# Patient Record
Sex: FEMALE | Race: WHITE | NOT HISPANIC OR LATINO | Employment: FULL TIME | URBAN - METROPOLITAN AREA
[De-identification: names, ages, dates, MRNs, and addresses within clinical notes are randomized per-mention and may not be internally consistent; named-entity substitution may affect disease eponyms.]

---

## 2019-10-21 ENCOUNTER — TRANSFERRED RECORDS (OUTPATIENT)
Dept: HEALTH INFORMATION MANAGEMENT | Facility: CLINIC | Age: 49
End: 2019-10-21

## 2021-01-01 ENCOUNTER — TRANSFERRED RECORDS (OUTPATIENT)
Dept: MULTI SPECIALTY CLINIC | Facility: CLINIC | Age: 51
End: 2021-01-01

## 2021-01-01 LAB — PAP SMEAR - HIM PATIENT REPORTED: NEGATIVE

## 2022-04-08 ENCOUNTER — TRANSFERRED RECORDS (OUTPATIENT)
Dept: MULTI SPECIALTY CLINIC | Facility: CLINIC | Age: 52
End: 2022-04-08

## 2023-01-07 ENCOUNTER — HEALTH MAINTENANCE LETTER (OUTPATIENT)
Age: 53
End: 2023-01-07

## 2023-01-12 ENCOUNTER — OFFICE VISIT (OUTPATIENT)
Dept: FAMILY MEDICINE | Facility: CLINIC | Age: 53
End: 2023-01-12
Payer: COMMERCIAL

## 2023-01-12 ENCOUNTER — TELEPHONE (OUTPATIENT)
Dept: FAMILY MEDICINE | Facility: CLINIC | Age: 53
End: 2023-01-12

## 2023-01-12 VITALS
HEART RATE: 83 BPM | RESPIRATION RATE: 16 BRPM | DIASTOLIC BLOOD PRESSURE: 73 MMHG | SYSTOLIC BLOOD PRESSURE: 103 MMHG | WEIGHT: 179 LBS | BODY MASS INDEX: 37.57 KG/M2 | TEMPERATURE: 98 F | HEIGHT: 58 IN | OXYGEN SATURATION: 97 %

## 2023-01-12 DIAGNOSIS — Z13.1 SCREENING FOR DIABETES MELLITUS: ICD-10-CM

## 2023-01-12 DIAGNOSIS — Z13.220 SCREENING FOR HYPERLIPIDEMIA: ICD-10-CM

## 2023-01-12 DIAGNOSIS — E66.812 OBESITY, CLASS II, BMI 35-39.9: ICD-10-CM

## 2023-01-12 DIAGNOSIS — Z11.59 NEED FOR HEPATITIS C SCREENING TEST: ICD-10-CM

## 2023-01-12 DIAGNOSIS — Z00.00 ROUTINE GENERAL MEDICAL EXAMINATION AT A HEALTH CARE FACILITY: Primary | ICD-10-CM

## 2023-01-12 DIAGNOSIS — Z86.32 HISTORY OF GESTATIONAL DIABETES: ICD-10-CM

## 2023-01-12 DIAGNOSIS — Z13.6 CARDIOVASCULAR SCREENING; LDL GOAL LESS THAN 160: ICD-10-CM

## 2023-01-12 LAB
CHOLEST SERPL-MCNC: 275 MG/DL
FASTING STATUS PATIENT QL REPORTED: YES
GLUCOSE SERPL-MCNC: 107 MG/DL (ref 70–99)
HCV AB SERPL QL IA: NONREACTIVE
HDLC SERPL-MCNC: 55 MG/DL
LDLC SERPL CALC-MCNC: 193 MG/DL
NONHDLC SERPL-MCNC: 220 MG/DL
TRIGL SERPL-MCNC: 136 MG/DL

## 2023-01-12 PROCEDURE — 99213 OFFICE O/P EST LOW 20 MIN: CPT | Mod: 25 | Performed by: FAMILY MEDICINE

## 2023-01-12 PROCEDURE — 36415 COLL VENOUS BLD VENIPUNCTURE: CPT | Performed by: FAMILY MEDICINE

## 2023-01-12 PROCEDURE — 99386 PREV VISIT NEW AGE 40-64: CPT | Performed by: FAMILY MEDICINE

## 2023-01-12 PROCEDURE — 82947 ASSAY GLUCOSE BLOOD QUANT: CPT | Performed by: FAMILY MEDICINE

## 2023-01-12 PROCEDURE — 86803 HEPATITIS C AB TEST: CPT | Performed by: FAMILY MEDICINE

## 2023-01-12 PROCEDURE — 80061 LIPID PANEL: CPT | Performed by: FAMILY MEDICINE

## 2023-01-12 ASSESSMENT — ENCOUNTER SYMPTOMS
CHILLS: 0
NAUSEA: 0
SHORTNESS OF BREATH: 0
HEARTBURN: 0
EYE PAIN: 0
DYSURIA: 0
COUGH: 0
HEMATURIA: 0
ARTHRALGIAS: 1
BREAST MASS: 0
FEVER: 0
PARESTHESIAS: 0
PALPITATIONS: 0
HEMATOCHEZIA: 0
HEADACHES: 0
CONSTIPATION: 0
SORE THROAT: 0
NERVOUS/ANXIOUS: 0
JOINT SWELLING: 0
DIZZINESS: 0
ABDOMINAL PAIN: 0
FREQUENCY: 0
MYALGIAS: 0
WEAKNESS: 0
DIARRHEA: 0

## 2023-01-12 ASSESSMENT — PAIN SCALES - GENERAL: PAINLEVEL: NO PAIN (0)

## 2023-01-12 NOTE — PROGRESS NOTES
SUBJECTIVE:   CC: Ameena is an 52 year old who presents for preventive health visit.     She works for [employer], part at home, part at office. Two adult children, son is , lives in town, has a child, daughter lives in [place].    Patient has been advised of split billing requirements and indicates understanding: Yes  Healthy Habits:     Getting at least 3 servings of Calcium per day:  Yes    Bi-annual eye exam:  NO    Dental care twice a year:  Yes    Sleep apnea or symptoms of sleep apnea:  None    Diet:  Regular (no restrictions)    Frequency of exercise:  2-3 days/week    Duration of exercise:  30-45 minutes    Taking medications regularly:  Yes    Medication side effects:  Not applicable    PHQ-2 Total Score: 0    Additional concerns today:  Yes    Today's PHQ-2 Score:   PHQ-2 ( 1999 Pfizer) 1/12/2023   Q1: Little interest or pleasure in doing things 0   Q2: Feeling down, depressed or hopeless 0   PHQ-2 Score 0   Q1: Little interest or pleasure in doing things Not at all   Q2: Feeling down, depressed or hopeless Not at all   PHQ-2 Score 0       Have you ever done Advance Care Planning? (For example, a Health Directive, POLST, or a discussion with a medical provider or your loved ones about your wishes): Yes, patient states has an Advance Care Planning document and will bring a copy to the clinic.    Social History     Tobacco Use     Smoking status: Never     Smokeless tobacco: Never   Substance Use Topics     Alcohol use: Not Currently     Alcohol/week: 1.0 standard drink     Types: 1 Standard drinks or equivalent per week         Alcohol Use 1/12/2023   Prescreen: >3 drinks/day or >7 drinks/week? No       Reviewed orders with patient.  Reviewed health maintenance and updated orders accordingly - Yes  BP Readings from Last 3 Encounters:   01/12/23 103/73    Wt Readings from Last 3 Encounters:   01/12/23 81.2 kg (179 lb)                  Patient Active Problem List   Diagnosis     History of  gestational diabetes     Obesity, Class II, BMI 35-39.9     Past Surgical History:   Procedure Laterality Date     APPENDECTOMY  1991    laparoscopic     BUNIONECTOMY LOU BILATERAL Bilateral 2012    and       SECTION  1995     HYSTERECTOMY  2001    menometrorhagia, no malignancy, ovaries, cervix remain       Social History     Tobacco Use     Smoking status: Never     Smokeless tobacco: Never   Substance Use Topics     Alcohol use: Not Currently     Alcohol/week: 1.0 standard drink     Types: 1 Standard drinks or equivalent per week     Family History   Problem Relation Age of Onset     Depression Mother      Prostate Cancer Father      Cerebrovascular Disease Father 85        d of massive stroke     Dementia Father      Colon Polyps Sister      Colon Polyps Brother          Current Outpatient Medications   Medication Sig Dispense Refill     semaglutide (OZEMPIC) 2 MG/1.5ML SOPN pen Inject 0.25 mg Subcutaneous every 7 days 1.5 mL 1     Allergies   Allergen Reactions     Penicillins Rash     No lab results found.     Breast Cancer Screenin2022 2:36 PM CDT  [facility]  PROCEDURE: MAMM SABAS SCREEN DIGITAL BILAT.    HISTORY: Breast cancer screening.    TECHNIQUE: Full-field digital mammography of the bilateral breasts was performed  in conventional CC and MLO views utilizing tomosynthesis.    COMPARISON: Mammograms 2021, 2020, and 2015.    FINDINGS:     There are scattered areas of fibroglandular density.    Right breast: No suspicious abnormality.    Left breast: No suspicious abnormality.    IMPRESSION:   No mammographic evidence of breast malignancy.  The patient will be notified of the results.    Recommendation: Normal Interval Follow-up    BI-RADS 1: Negative.    Electronically signed by Domingo Wren MD   Report Date: 2022 2:29 PM           Breast CA Risk Assessment (FHS-7) 2023   Do you have a family  history of breast, colon, or ovarian cancer? No / Unknown         Mammogram Screening: Recommended annual mammography  Pertinent mammograms are reviewed under the imaging tab.    History of abnormal Pap smear: NO - age 30-65 PAP every 5 years with negative HPV co-testing recommended     Reviewed and updated as needed this visit by clinical staff   Tobacco  Allergies  Meds  Problems  Med Hx   Fam Hx          Reviewed and updated as needed this visit by Provider   Tobacco  Allergies   Problems  Med Hx   Fam Hx         Past Medical History:   Diagnosis Date     History of colonic polyps       Past Surgical History:   Procedure Laterality Date     APPENDECTOMY  1991    laparoscopic     BUNIONECTOMY LOU BILATERAL Bilateral 2012    and       SECTION  1995     HYSTERECTOMY  2001    menometrorhagia, no malignancy, ovaries, cervix remain     OB History    Para Term  AB Living   2 2 0 2 0 2   SAB IAB Ectopic Multiple Live Births   0 0 0 0 2      # Outcome Date GA Lbr Jamarcus/2nd Weight Sex Delivery Anes PTL Lv   2  99 36w0d  3.118 kg (6 lb 14 oz) F CS-LTranv   CHINYERE      Birth Comments: bed rest for 5 months,  labor, csection when lungs werwe mature      Name: Carmella Anderson  95 36w0d  2.863 kg (6 lb 5 oz) M CS-LTranv  Y CHINYERE      Birth Comments: partial placental abruption at 28 weeks, bedrest, failure to progress with labor      Name: Gerardo       Review of Systems   Constitutional: Negative for chills and fever.   HENT: Negative for congestion, ear pain, hearing loss and sore throat.    Eyes: Negative for pain and visual disturbance.   Respiratory: Negative for cough and shortness of breath.    Cardiovascular: Negative for chest pain, palpitations and peripheral edema.   Gastrointestinal: Negative for abdominal pain, constipation, diarrhea, heartburn, hematochezia and nausea.   Breasts:  Negative for tenderness, breast mass  "and discharge.   Genitourinary: Negative for dysuria, frequency, genital sores, hematuria, pelvic pain, urgency, vaginal bleeding and vaginal discharge.   Musculoskeletal: Positive for arthralgias. Negative for joint swelling and myalgias.   Skin: Negative for rash.   Neurological: Negative for dizziness, weakness, headaches and paresthesias.   Psychiatric/Behavioral: Negative for mood changes. The patient is not nervous/anxious.      Family History   Problem Relation Age of Onset     Depression Mother      Prostate Cancer Father      Cerebrovascular Disease Father 85        d of massive stroke     Dementia Father      Colon Polyps Sister      Colon Polyps Brother          OBJECTIVE:   /73 (BP Location: Left arm, Patient Position: Sitting, Cuff Size: Adult Large)   Pulse 83   Temp 98  F (36.7  C) (Tympanic)   Resp 16   Ht 1.461 m (4' 9.5\")   Wt 81.2 kg (179 lb)   SpO2 97%   BMI 38.06 kg/m     Wt Readings from Last 4 Encounters:   01/12/23 81.2 kg (179 lb)     Physical Exam  GENERAL: healthy, alert and no distress  EYES: Eyes grossly normal to inspection, PERRL and conjunctivae and sclerae normal  HENT: ear canals and TM's normal, nose and mouth without ulcers or lesions  NECK: no adenopathy, no asymmetry, masses, or scars and thyroid normal to palpation  RESP: lungs clear to auscultation - no rales, rhonchi or wheezes  CV: regular rate and rhythm, normal S1 S2, no S3 or S4, no murmur, click or rub, no peripheral edema and peripheral pulses strong  MS: no gross musculoskeletal defects noted, no edema  SKIN: no suspicious lesions or rashes  NEURO: Normal strength and tone, mentation intact and speech normal  PSYCH: mentation appears normal, affect normal/bright    ASSESSMENT/PLAN:   (Z00.00) Routine general medical examination at a health care facility  (primary encounter diagnosis)  Comment: routine, healthy woman, utd with outside records as documented    (Z11.59) Need for hepatitis C screening " test  Plan: Hepatitis C Screen Reflex to HCV RNA Quant and         Genotype    (Z13.6) CARDIOVASCULAR SCREENING; LDL GOAL LESS THAN 160  Plan: Lipid panel reflex to direct LDL Non-fasting  Biometric screen completed.    (Z13.220) Screening for hyperlipidemia  Plan: Lipid panel reflex to direct LDL Non-fasting    (Z86.32) History of gestational diabetes  Plan: Glucose      Will fu as indicated.     (Z13.1) Screening for diabetes mellitus  Plan: Glucose    (E66.9) Obesity, Class II, BMI 35-39.9  Comment: already participating in nutritional wellness program, using Noom, incorporating many healthy habits, see AVS.  Start medication as below, follow up 1-2 months  Plan: semaglutide (OZEMPIC) 2 MG/1.5ML SOPN pen         Patient has been advised of split billing requirements and indicates understanding: Yes      COUNSELING:  Reviewed preventive health counseling, as reflected in patient instructions       Regular exercise       Healthy diet/nutrition       Colorectal Cancer Screening        She reports that she has never smoked. She has never used smokeless tobacco.          Jacinta Vera MD  [facility]

## 2023-01-12 NOTE — TELEPHONE ENCOUNTER
"PA started for semaglutide (OZEMPIC) 2 MG/1.5ML SOPN pen.  To submit the PA:  1. Go to [email or web address] and click Enter a key  2. Enter the pt's last name and date of birth and the ricks   Patient last name:Tsering   :[birth year]   Ricks:[id number]  3. Complete the form and click \"Send to Plan\"    "

## 2023-01-12 NOTE — PATIENT INSTRUCTIONS
Weight loss  Great job starting every day with breakfast; add something with fiber;  Add oatmeal? Flax or janki seeds?  Activity; great idea weight lifting, increases your metabolism! Add 5 minute high intensity every hour at work (up and down stairs; plank for a minute)  Great job with water    Preventive Health Recommendations  Female Ages 50 - 64    Yearly exam: See your health care provider every year in order to  Review health changes.   Discuss preventive care.    Review your medicines if your doctor has prescribed any.    Get a Pap test every three years (unless you have an abnormal result and your provider advises testing more often).  If you get Pap tests with HPV test, you only need to test every 5 years, unless you have an abnormal result.   You do not need a Pap test if your uterus was removed (hysterectomy) and you have not had cancer.  You should be tested each year for STDs (sexually transmitted diseases) if you're at risk.   Have a mammogram every 1 to 2 years.  Have a colonoscopy at age 50, or have a yearly FIT test (stool test). These exams screen for colon cancer.    Have a cholesterol test every 5 years, or more often if advised.  Have a diabetes test (fasting glucose) every three years. If you are at risk for diabetes, you should have this test more often.   If you are at risk for osteoporosis (brittle bone disease), think about having a bone density scan (DEXA).    Shots: Get a flu shot each year. Get a tetanus shot every 10 years.    Nutrition:   Eat at least 5 servings of fruits and vegetables each day.  Eat whole-grain bread, whole-wheat pasta and brown rice instead of white grains and rice.  Get adequate Calcium and Vitamin D.     Lifestyle  Exercise at least 150 minutes a week (30 minutes a day, 5 days a week). This will help you control your weight and prevent disease.  Limit alcohol to one drink per day.  No smoking.   Wear sunscreen to prevent skin cancer.   See your dentist every six  months for an exam and cleaning.  See your eye doctor every 1 to 2 years.

## 2023-01-12 NOTE — Clinical Note
Normal mammogram: 04/08/2022 2:36 PM CDT  [facility] PROCEDURE: MAMM SABAS SCREEN DIGITAL BILAT.  HISTORY: Breast cancer screening.

## 2023-01-17 NOTE — TELEPHONE ENCOUNTER
PA Initiation    Medication: semaglutide (OZEMPIC) 2 MG/1.5ML SOPN pen - Initiated  Insurance Company: [employer] ([facility]) - Phone [phone] Fax [phone]  Pharmacy Filling the Rx: [id number] - [place], [place] - [address] AT [facility] [address]  Filling Pharmacy Phone: [phone]  Filling Pharmacy Fax: [phone]  Start Date: 1/17/2023

## 2023-01-17 NOTE — TELEPHONE ENCOUNTER
PRIOR AUTHORIZATION DENIED    Medication: semaglutide (OZEMPIC) 2 MG/1.5ML SOPN pen - DENIED    Denial Date: 1/17/2023    Denial Rational:      Appeal Information:

## 2023-01-17 NOTE — RESULT ENCOUNTER NOTE
Thank you very much for getting labs done!     Your screening test was negative for Hepatitis C, which is great news! You have NOT been infected with this liver disease.  You do not need any further testing for Hepatitis C.     Your glucose test was normal for a nonfasting sample.    Your total cholesterol and LDL cholesterol are a little high.  Thank you for getting your cholesterol checked!  Desired or goal levels are:  CHOLESTEROL: Desirable is less than 200.  HDL (Good Cholesterol): Desirable is greater than 40 in men and greater than 50 in women.  LDL (Bad Cholesterol): Desirable is less than 130  TRIGLYCERIDES: Desirable is less than 150.    As you may know, abnormal cholesterol is one factor that increases your risk for heart disease and stroke. You can improve your cholesterol by controlling the amount and type of fat you eat and by increasing your daily activity level.    Here are some ways to improve your nutrition (adapted from the American Academy of Family Practice handout):  Eat less fat (especially butter, Crisco and other saturated fats)  Buy lean cuts of meat; reduce your portions of red meat or substitute poultry or fish, or avoid meat altogether.  Use skim milk and low-fat dairy products  Eat no more than 4 egg yolks per week  Avoid fried or fast foods that are high in fat  Eat more fruits and vegetables, trying to make your plate of food at least half non-starchy vegetables.    If you have any questions, please contact the clinic or schedule an appointment with me, thank you!      Sincerely,  Dr. Jacinta Vera MD  1/17/2023

## 2023-01-26 ENCOUNTER — TELEPHONE (OUTPATIENT)
Dept: FAMILY MEDICINE | Facility: CLINIC | Age: 53
End: 2023-01-26
Payer: COMMERCIAL

## 2023-01-26 NOTE — TELEPHONE ENCOUNTER
Forms/Letter Request    Type of form/letter: Medical    Have you been seen for this request: Yes 01/12/23    Do we have the form/letter: Yes: Found completed in outgoing mail re [facility] Faxed to [phone], Copy sent to abstract and copy kept in clinic     When is form/letter needed by: N/A    How would you like the form/letter returned: Fax

## 2023-03-18 ENCOUNTER — E-VISIT (OUTPATIENT)
Dept: URGENT CARE | Facility: CLINIC | Age: 53
End: 2023-03-18
Payer: COMMERCIAL

## 2023-03-18 DIAGNOSIS — N39.0 ACUTE UTI (URINARY TRACT INFECTION): Primary | ICD-10-CM

## 2023-03-18 PROCEDURE — 99421 OL DIG E/M SVC 5-10 MIN: CPT | Performed by: PHYSICIAN ASSISTANT

## 2023-03-18 RX ORDER — NITROFURANTOIN 25; 75 MG/1; MG/1
100 CAPSULE ORAL 2 TIMES DAILY
Qty: 10 CAPSULE | Refills: 0 | Status: SHIPPED | OUTPATIENT
Start: 2023-03-18 | End: 2023-03-23

## 2023-03-18 NOTE — PATIENT INSTRUCTIONS
Deasuzanna Cagle    After reviewing your responses, I've been able to diagnose you with a urinary tract infection, which is a common infection of the bladder with bacteria.  This is not a sexually transmitted infection, though urinating immediately after intercourse can help prevent infections.  Drinking lots of fluids is also helpful to clear your current infection and prevent the next one.      I have sent a prescription for antibiotics to your pharmacy to treat this infection.    It is important that you take all of your prescribed medication even if your symptoms are improving after a few doses.  Taking all of your medicine helps prevent the symptoms from returning.     If your symptoms worsen, you develop pain in your back or stomach, develop fevers, or are not improving in 5 days, please contact your primary care provider for an appointment or visit any of our convenient Walk-in or Urgent Care Centers to be seen, which can be found on our website here.    Thanks again for choosing us as your health care partner,    Freddy Mendoza, [facility], PA-C    Urinary Tract Infections in Women  Urinary tract infections (UTIs) are most often caused by bacteria. These bacteria enter the urinary tract. The bacteria may come from inside the body. Or they may travel from the skin outside the rectum or vagina into the urethra. Female anatomy makes it easy for bacteria from the bowel to enter a woman s urinary tract, which is the most common source of UTI. This means women develop UTIs more often than men. Pain in or around the urinary tract is a common UTI symptom. But the only way to know for sure if you have a UTI for the healthcare provider to test your urine. The two tests that may be done are the urinalysis and urine culture.    Types of UTIs    Cystitis. A bladder infection (cystitis) is the most common UTI in women. You may have urgent or frequent need to pee. You may also have pain, burning when you pee, and bloody  urine.    Urethritis. This is an inflamed urethra, which is the tube that carries urine from the bladder to outside the body. You may have lower stomach or back pain. You may also have urgent or frequent need to pee.    Pyelonephritis. This is a kidney infection. If not treated, it can be serious and damage your kidneys. In severe cases, you may need to stay in the hospital. You may have a fever and lower back pain.    Medicines to treat a UTI  Most UTIs are treated with antibiotics. These kill the bacteria. The length of time you need to take them depends on the type of infection. It may be as short as 3 days. If you have repeated UTIs, you may need a low-dose antibiotic for several months. Take antibiotics exactly as directed. Don t stop taking them until all of the medicine is gone, even if you feel better. If you stop taking the antibiotic too soon, the infection may not go away. You may also develop a resistance to the antibiotic. This can make it much harder to treat.  Lifestyle changes to treat and prevent UTIs  The lifestyle changes below will help get rid of your UTI. They may also help prevent future UTIs.    Drink plenty of fluids. This includes water, juice, or other caffeine-free drinks. Fluids help flush bacteria out of your body.    Empty your bladder. Always empty your bladder when you feel the urge to pee. And always pee before going to sleep. Urine that stays in your bladder can lead to infection. Try to pee before and after sex as well.    Practice good personal hygiene. Wipe yourself from front to back after using the toilet. This helps keep bacteria from getting into the urethra.    Wear cotton underwear. Don't wear synthetic or tight-fitting underwear that can trap moisture. Change out of wet bathing suits and workout clothing quickly.    Take showers. Showers are better than baths for preventing UTIs.    Use condoms during sex. These help prevent UTIs caused by sexually transmitted bacteria.  Also don't use spermicides during sex. These can increase the risk for UTIs. Choose other forms of birth control instead. For women who tend to get UTIs after sex, a low-dose of a preventive antibiotic may be used. Be sure to discuss this option with your healthcare provider.    Follow up with your healthcare provider as directed. They may test to make sure the infection has cleared. If needed, more treatment may be started.  [employer] last reviewed this educational content on 9/1/2021 2000-2022 [employer]. All rights reserved. This information is not intended as a substitute for professional medical care. Always follow your healthcare professional's instructions.

## 2023-04-10 ENCOUNTER — MYC MEDICAL ADVICE (OUTPATIENT)
Dept: FAMILY MEDICINE | Facility: CLINIC | Age: 53
End: 2023-04-10
Payer: COMMERCIAL

## 2023-04-18 ENCOUNTER — TELEPHONE (OUTPATIENT)
Dept: FAMILY MEDICINE | Facility: CLINIC | Age: 53
End: 2023-04-18
Payer: COMMERCIAL

## 2023-04-18 NOTE — TELEPHONE ENCOUNTER
PRIOR AUTHORIZATION DENIED    Medication: semaglutide (OZEMPIC) 2 MG/1.5ML SOPN pen - Denied    Denial Date: 4/17/2023    Denial Rational: [facility] that was initiated for Ozempic for patient and to call back to answer additional questions. PA was already denied when I returned call for same reasonings in January. Per plan, the only weight loss med plan may cover at this time is Phentermine.       Appeal Information:

## 2023-04-19 NOTE — TELEPHONE ENCOUNTER
Hi, yes please ask her to schedule a virtual visit to discuss weight loss management, thank you!  Sincerely,  Dr. Jacinta Vera MD  4/19/2023

## 2023-04-19 NOTE — TELEPHONE ENCOUNTER
Dr. Vera-Please advise if you recommend patient schedule virtual visit to discuss starting Phentermine?    Thank you!  GIAN HsuN, RN-[facility]

## 2023-04-20 ENCOUNTER — TELEPHONE (OUTPATIENT)
Dept: FAMILY MEDICINE | Facility: CLINIC | Age: 53
End: 2023-04-20
Payer: COMMERCIAL

## 2023-04-20 NOTE — TELEPHONE ENCOUNTER
semaglutide (OZEMPIC) 2 MG/1.5ML SOPN pen (Discontinued)      Last Written Prescription Date:  1-12-23  Last Fill Quantity: 1.5 ml,   # refills: 1  Last Office Visit: 1-12-23  Future Office visit:       Routing refill request to provider for review/approval because:  Drug not active on patient's medication list

## 2023-04-20 NOTE — TELEPHONE ENCOUNTER
Patient hasn't had this medication, insurance doesn't cover for weight loss, patient to schedule virtual appointment with me to discuss options. See telephone note 4/18/23.  Sincerely,  Dr. Jacinta Vera MD  4/20/2023

## 2023-04-21 ENCOUNTER — ANCILLARY PROCEDURE (OUTPATIENT)
Dept: GENERAL RADIOLOGY | Facility: CLINIC | Age: 53
End: 2023-04-21
Attending: FAMILY MEDICINE
Payer: COMMERCIAL

## 2023-04-21 ENCOUNTER — OFFICE VISIT (OUTPATIENT)
Dept: URGENT CARE | Facility: URGENT CARE | Age: 53
End: 2023-04-21
Payer: COMMERCIAL

## 2023-04-21 VITALS
BODY MASS INDEX: 37.57 KG/M2 | WEIGHT: 179 LBS | TEMPERATURE: 99.5 F | DIASTOLIC BLOOD PRESSURE: 82 MMHG | OXYGEN SATURATION: 96 % | SYSTOLIC BLOOD PRESSURE: 121 MMHG | HEART RATE: 77 BPM | RESPIRATION RATE: 15 BRPM | HEIGHT: 58 IN

## 2023-04-21 DIAGNOSIS — J40 BRONCHITIS: Primary | ICD-10-CM

## 2023-04-21 DIAGNOSIS — J40 BRONCHITIS: ICD-10-CM

## 2023-04-21 PROCEDURE — 71046 X-RAY EXAM CHEST 2 VIEWS: CPT | Mod: TC | Performed by: RADIOLOGY

## 2023-04-21 PROCEDURE — 99213 OFFICE O/P EST LOW 20 MIN: CPT | Performed by: FAMILY MEDICINE

## 2023-04-21 RX ORDER — AZITHROMYCIN 250 MG/1
TABLET, FILM COATED ORAL
Qty: 6 TABLET | Refills: 0 | Status: SHIPPED | OUTPATIENT
Start: 2023-04-21 | End: 2023-04-26

## 2023-04-21 RX ORDER — ALBUTEROL SULFATE 90 UG/1
AEROSOL, METERED RESPIRATORY (INHALATION)
COMMUNITY
Start: 2023-03-07

## 2023-04-21 NOTE — PROGRESS NOTES
"Assessment & Plan     Bronchitis  Continue steroids, azithromycin, increase albuterol to 3-4x/day  - XR Chest 2 Views  - azithromycin (ZITHROMAX) 250 MG tablet  Dispense: 6 tablet; Refill: 0             No follow-ups on file.    Soren Phelan MD  [facility] [place]    Marisela Worley is a 52 year old female who presents to clinic today for the following health issues:  Chief Complaint   Patient presents with     Cough     X 1 week     Urgent Care     Taken 2 neg at home covids, was given round of steroids from evisit but is not getting better      Fever     X 1 week, low grade fever     HPI    Here with cough for about 1 week.  Here with concern about not improving.  Was placed on steroids/tessalon.  Is traveling tomorrow out of country.  nyquil and dayquil.  Wheezy at night.  Has been using dailly and rescue inhaler in evening.        Review of Systems        Objective    /82 (BP Location: Right arm, Patient Position: Sitting, Cuff Size: Adult Regular)   Pulse 77   Temp 99.5  F (37.5  C) (Oral)   Resp 15   Ht 1.461 m (4' 9.5\")   Wt 81.2 kg (179 lb)   SpO2 96%   BMI 38.06 kg/m    Physical Exam  Vitals and nursing note reviewed.   Constitutional:       Appearance: Normal appearance.   HENT:      Right Ear: Tympanic membrane normal.      Left Ear: Tympanic membrane normal.      Mouth/Throat:      Mouth: Mucous membranes are moist.   Eyes:      Pupils: Pupils are equal, round, and reactive to light.   Cardiovascular:      Rate and Rhythm: Normal rate and regular rhythm.      Pulses: Normal pulses.      Heart sounds: Normal heart sounds.   Pulmonary:      Effort: Pulmonary effort is normal.      Breath sounds: Wheezing present.      Comments: bilateral  Musculoskeletal:      Cervical back: Neck supple.   Neurological:      Mental Status: She is alert.            CXR - Reviewed and interpreted by me Normal- no infiltrates, effusions, pneumothoraces, cardiomegaly or masses   "

## 2023-04-21 NOTE — PATIENT INSTRUCTIONS
I think you have bronchitis.    No pneumonia    Finish the steroids    I sent in azithromycin for 5 days. Also use your albuterol inhaler 3-4x/day until improved.

## 2023-05-09 ENCOUNTER — VIRTUAL VISIT (OUTPATIENT)
Dept: FAMILY MEDICINE | Facility: CLINIC | Age: 53
End: 2023-05-09
Payer: COMMERCIAL

## 2023-05-09 ENCOUNTER — TELEPHONE (OUTPATIENT)
Dept: FAMILY MEDICINE | Facility: CLINIC | Age: 53
End: 2023-05-09

## 2023-05-09 DIAGNOSIS — E66.812 OBESITY, CLASS II, BMI 35-39.9: Primary | ICD-10-CM

## 2023-05-09 PROCEDURE — 99213 OFFICE O/P EST LOW 20 MIN: CPT | Mod: VID | Performed by: FAMILY MEDICINE

## 2023-05-09 RX ORDER — ESCITALOPRAM OXALATE 10 MG/1
TABLET ORAL
COMMUNITY
Start: 2023-02-14 | End: 2024-01-19

## 2023-05-09 NOTE — TELEPHONE ENCOUNTER
Plan does not cover liraglutide - Weight Management (SAXENDA) 18 MG/3ML pen.  Please start a new PA.    Reason for denial: Plan does not cover.    Insurance plan:    Patient ID: [id number]

## 2023-05-09 NOTE — PROGRESS NOTES
"Answers for HPI/ROS submitted by the patient on 5/5/2023  What is the reason for your visit today? : Obesity - weight loss  How many servings of fruits and vegetables do you eat daily?: 4 or more  On average, how many sweetened beverages do you drink each day (Examples: soda, juice, sweet tea, etc.  Do NOT count diet or artificially sweetened beverages)?: 0  How many minutes a day do you exercise enough to make your heart beat faster?: 30 to 60  How many days a week do you exercise enough to make your heart beat faster?: 3 or less  How many days per week do you miss taking your medication?: 0  Wt Readings from Last 4 Encounters:   04/21/23 81.2 kg (179 lb)   01/12/23 81.2 kg (179 lb)   Estimated body mass index is 38.06 kg/m  as calculated from the following:    Height as of 4/21/23: 1.461 m (4' 9.5\").    Weight as of 4/21/23: 81.2 kg (179 lb).   Last note 1/12/2023  (E66.9) Obesity, Class II, BMI 35-39.9  Comment: already participating in nutritional wellness program, using [employer], incorporating many healthy habits, see AVS.  Start medication as below, follow up 1-2 months  Plan: semaglutide (OZEMPIC) 2 MG/1.5ML SOPN pen    Brunilda is a 52 year old who is being evaluated via a billable video visit.      Assessment & Plan     (E66.9) Obesity, Class II, BMI 35-39.9  (primary encounter diagnosis)  Comment: see HPI, she has tried mutliple medical management and lifestyle change strategies without much success and would like to try a GLP1 agonists, Ozempic not covered by insurance, will try Saxenda as below. Advised of side effects includiung possibly N/V which should wane with time.  Follow up one month..  Plan: liraglutide - Weight Management (SAXENDA) 18         MG/3ML pen          BMI:   Estimated body mass index is 38.06 kg/m  as calculated from the following:    Height as of 4/21/23: 1.461 m (4' 9.5\").    Weight as of 4/21/23: 81.2 kg (179 lb).   Weight management plan: see above for medication order    Jacinta Fitzpatrick " "MD Chuy  [facility]    Marisela Worley is a 52 year old, presenting for the following health issues:  She has wanted to try Ozempic but insurance won't cover it if she doesn't have diabetes.  She tried metformin and topirimate in the past and has severe adverse side effects.  Metformin gave her GERD and topiramate made her groggy, she couldn't function.    No chief complaint on file.         View : No data to display.              HPI   Review of Systems   Constitutional, HEENT, cardiovascular, pulmonary, gi and gu systems are negative, except as otherwise noted.      Objective         Vitals:  No vitals were obtained today due to virtual visit.    Physical Exam   Wt Readings from Last 4 Encounters:   04/21/23 81.2 kg (179 lb)   01/12/23 81.2 kg (179 lb)     Estimated body mass index is 38.06 kg/m  as calculated from the following:    Height as of 4/21/23: 1.461 m (4' 9.5\").    Weight as of 4/21/23: 81.2 kg (179 lb).   GENERAL: Healthy, alert and no distress  EYES: Eyes grossly normal to inspection.  No discharge or erythema, or obvious scleral/conjunctival abnormalities.  RESP: No audible wheeze, cough, or visible cyanosis.  No visible retractions or increased work of breathing.    SKIN: Visible skin clear. No significant rash, abnormal pigmentation or lesions.  NEURO: Cranial nerves grossly intact.  Mentation and speech appropriate for age.  PSYCH: Mentation appears normal, affect normal/bright, judgement and insight intact, normal speech and appearance well-groomed.          Video-Visit Details    Type of service:  Video Visit     Originating Location (pt. Location): Home  Distant Location (provider location):  Off-site  Platform used for Video Visit: Lorena"

## 2023-05-10 ENCOUNTER — OFFICE VISIT (OUTPATIENT)
Dept: FAMILY MEDICINE | Facility: CLINIC | Age: 53
End: 2023-05-10
Payer: COMMERCIAL

## 2023-05-10 ENCOUNTER — MYC MEDICAL ADVICE (OUTPATIENT)
Dept: FAMILY MEDICINE | Facility: CLINIC | Age: 53
End: 2023-05-10

## 2023-05-10 DIAGNOSIS — Z12.83 ENCOUNTER FOR SCREENING FOR MALIGNANT NEOPLASM OF SKIN: ICD-10-CM

## 2023-05-10 DIAGNOSIS — D18.01 CHERRY ANGIOMA: ICD-10-CM

## 2023-05-10 DIAGNOSIS — L81.4 LENTIGINES: ICD-10-CM

## 2023-05-10 DIAGNOSIS — B00.1 COLD SORE: ICD-10-CM

## 2023-05-10 DIAGNOSIS — L82.1 SEBORRHEIC KERATOSES: ICD-10-CM

## 2023-05-10 DIAGNOSIS — D48.9 NEOPLASM OF UNCERTAIN BEHAVIOR: ICD-10-CM

## 2023-05-10 DIAGNOSIS — D22.9 MULTIPLE BENIGN NEVI: Primary | ICD-10-CM

## 2023-05-10 PROCEDURE — 11102 TANGNTL BX SKIN SINGLE LES: CPT | Performed by: NURSE PRACTITIONER

## 2023-05-10 PROCEDURE — 99204 OFFICE O/P NEW MOD 45 MIN: CPT | Mod: 25 | Performed by: NURSE PRACTITIONER

## 2023-05-10 PROCEDURE — 88305 TISSUE EXAM BY PATHOLOGIST: CPT | Performed by: DERMATOLOGY

## 2023-05-10 RX ORDER — VALACYCLOVIR HYDROCHLORIDE 500 MG/1
500 TABLET, FILM COATED ORAL 2 TIMES DAILY
Qty: 18 TABLET | Refills: 4 | Status: SHIPPED | OUTPATIENT
Start: 2023-05-10 | End: 2024-04-16

## 2023-05-10 ASSESSMENT — PAIN SCALES - GENERAL: PAINLEVEL: NO PAIN (0)

## 2023-05-10 NOTE — PROGRESS NOTES
[facility] Dermatology Note  Encounter Date: May 10, 2023  Office Visit     Reviewed patients past medical history and pertinent chart review prior to patients visit today.     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    Father has had skin cancer, type unknown    ____________________________________________    Assessment & Plan:     # Neoplasm of uncertain behavior: Left superior buttock blue nevus versus deep angioma rule out malignancy DDx includes . Shave biopsy today.    Procedure Note: Biopsy by shave technique  The risks and benefits of the procedure were described to the patient. These include but are not limited to bleeding, infection, scar, incomplete removal, and non-diagnostic biopsy. Verbal informed consent was obtained. The above site(s) was cleansed with an alcohol pad and injected with 1% lidocaine with epinephrine. Once anesthesia was obtained, a biopsy(ies) was performed with Gilette blade. The tissue(s) was placed in a labeled container(s) with formalin and sent to pathology. Hemostasis was achieved with aluminum chloride. Vaseline and a bandage were applied to the wound(s). The patient tolerated the procedure well and was given post biopsy care instructions.     # recurrent cold sores. Discussed antiviral treatment with outbreaks. Patient is interested in trying antivirals. Advised to take valtrex 500 mg at first sign of an outbreak BID x3 days. Take with each outbreak. Discussed side effects of medication. If patient has more frequent outbreaks we may discuss viral suppression treatment      # Benign skin findings including: seborrheic keratoses, cherry angioma, lentigines and benign nevi.   - No further intervention required. Patient to report changes.   - Patient reassured of the benign nature of these lesions.    #Signs  "and Symptoms of non-melanoma skin cancer and ABCDEs of melanoma reviewed with patient. Patient encouraged to perform monthly self skin exams and educated on how to perform them. UV precautions reviewed with patient. Patient was asked about new or changing moles/lesions on body.     #Reviewed Sunscreen: Apply 20 minutes prior to going outdoors and reapply every two hours, when wet or sweating. We recommend using an SPF 30 or higher, and to use one that is water resistant.       Follow-up:  1-2 years for follow up full body skin exam, prn for new or changing lesions or new concerns    Breana Josue CNP  Dermatology     ____________________________________________    CC: Derm Problem and Skin Check ([facility], discuss impetego)    HPI:  Ms. Ameena Cagle is a(n) 52 year old female who presents today as a new patient for a full body skin cancer screening. Patient has concerns today about a blue appearing spot on the left buttock.  She has noticed this only for the past 1 year.  She does not think it is changing.  It is asymptomatic.  She also says that she has recurrent \"impetigo\" under her nose that has recurred 3 times this year.  She feels like it is painful and tingly when it first starts even before she sees the sore.    Patient is otherwise feeling well, without additional skin concerns.     Physical Exam:  Vitals: There were no vitals taken for this visit.  SKIN: Total skin excluding the genitalia areas was performed. The exam included the head/face, neck, both arms, chest, back, abdomen, both legs, digits, mons pubis, buttock and nails.   -left buttock, 2 mm blue macule, non-blanchable  -right upper cutaneous lip, healing pink erosion  -several 1-2mm red dome shaped symmetric papules scattered on the trunk  -multiple tan/brown flat round macules and raised papules scattered throughout trunk, extremities and head. No worrisome features for malignancy noted on examination.  -scattered tan, homogenous macules scattered " on sun exposed areas of trunk, extremities and face.   -scattered waxy, stuck on tan/brown papules and patches on the trunk     - No other lesions of concern on areas examined.     Medications:  Current Outpatient Medications   Medication     albuterol (PROAIR HFA/PROVENTIL HFA/VENTOLIN HFA) 108 (90 Base) MCG/ACT inhaler     amitriptyline (ELAVIL) 25 MG tablet     escitalopram (LEXAPRO) 10 MG tablet     fluticasone (FLOVENT DISKUS) 100 MCG/ACT inhaler     liraglutide - Weight Management (SAXENDA) 18 MG/3ML pen     valACYclovir (VALTREX) 500 MG tablet     No current facility-administered medications for this visit.      Past Medical History:   Patient Active Problem List   Diagnosis     History of gestational diabetes     Obesity, Class II, BMI 35-39.9     Past Medical History:   Diagnosis Date     History of colonic polyps        CC No referring provider defined for this encounter. on close of this encounter.

## 2023-05-10 NOTE — LETTER
5/10/2023         RE: Ameena Cagle  [address] [place] [address]        Dear Colleague,    Thank you for referring your patient, Ameena Cagle, to the [facility]. Please see a copy of my visit note below.    [facility] Dermatology Note  Encounter Date: May 10, 2023  Office Visit     Reviewed patients past medical history and pertinent chart review prior to patients visit today.     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    Father has had skin cancer, type unknown    ____________________________________________    Assessment & Plan:     # Neoplasm of uncertain behavior: Left superior buttock blue nevus versus deep angioma rule out malignancy DDx includes . Shave biopsy today.    Procedure Note: Biopsy by shave technique  The risks and benefits of the procedure were described to the patient. These include but are not limited to bleeding, infection, scar, incomplete removal, and non-diagnostic biopsy. Verbal informed consent was obtained. The above site(s) was cleansed with an alcohol pad and injected with 1% lidocaine with epinephrine. Once anesthesia was obtained, a biopsy(ies) was performed with Gilette blade. The tissue(s) was placed in a labeled container(s) with formalin and sent to pathology. Hemostasis was achieved with aluminum chloride. Vaseline and a bandage were applied to the wound(s). The patient tolerated the procedure well and was given post biopsy care instructions.     # recurrent cold sores. Discussed antiviral treatment with outbreaks. Patient is interested in trying antivirals. Advised to take valtrex 500 mg at first sign of an outbreak BID x3 days. Take with each outbreak. Discussed side effects of medication. If patient has more frequent outbreaks we may discuss viral  "suppression treatment      # Benign skin findings including: seborrheic keratoses, cherry angioma, lentigines and benign nevi.   - No further intervention required. Patient to report changes.   - Patient reassured of the benign nature of these lesions.    #Signs and Symptoms of non-melanoma skin cancer and ABCDEs of melanoma reviewed with patient. Patient encouraged to perform monthly self skin exams and educated on how to perform them. UV precautions reviewed with patient. Patient was asked about new or changing moles/lesions on body.     #Reviewed Sunscreen: Apply 20 minutes prior to going outdoors and reapply every two hours, when wet or sweating. We recommend using an SPF 30 or higher, and to use one that is water resistant.       Follow-up:  1-2 years for follow up full body skin exam, prn for new or changing lesions or new concerns    Breana Josue, PARISH  Dermatology     ____________________________________________    CC: Derm Problem and Skin Check ([facility], discuss impetego)    HPI:  Ms. Ameena Cagle is a(n) 52 year old female who presents today as a new patient for a full body skin cancer screening. Patient has concerns today about a blue appearing spot on the left buttock.  She has noticed this only for the past 1 year.  She does not think it is changing.  It is asymptomatic.  She also says that she has recurrent \"impetigo\" under her nose that has recurred 3 times this year.  She feels like it is painful and tingly when it first starts even before she sees the sore.    Patient is otherwise feeling well, without additional skin concerns.     Physical Exam:  Vitals: There were no vitals taken for this visit.  SKIN: Total skin excluding the genitalia areas was performed. The exam included the head/face, neck, both arms, chest, back, abdomen, both legs, digits, mons pubis, buttock and nails.   -left buttock, 2 mm blue macule, non-blanchable  -right upper cutaneous lip, healing pink erosion  -several 1-2mm red dome " shaped symmetric papules scattered on the trunk  -multiple tan/brown flat round macules and raised papules scattered throughout trunk, extremities and head. No worrisome features for malignancy noted on examination.  -scattered tan, homogenous macules scattered on sun exposed areas of trunk, extremities and face.   -scattered waxy, stuck on tan/brown papules and patches on the trunk     - No other lesions of concern on areas examined.     Medications:  Current Outpatient Medications   Medication     albuterol (PROAIR HFA/PROVENTIL HFA/VENTOLIN HFA) 108 (90 Base) MCG/ACT inhaler     amitriptyline (ELAVIL) 25 MG tablet     escitalopram (LEXAPRO) 10 MG tablet     fluticasone (FLOVENT DISKUS) 100 MCG/ACT inhaler     liraglutide - Weight Management (SAXENDA) 18 MG/3ML pen     valACYclovir (VALTREX) 500 MG tablet     No current facility-administered medications for this visit.      Past Medical History:   Patient Active Problem List   Diagnosis     History of gestational diabetes     Obesity, Class II, BMI 35-39.9     Past Medical History:   Diagnosis Date     History of colonic polyps        CC No referring provider defined for this encounter. on close of this encounter.      Again, thank you for allowing me to participate in the care of your patient.        Sincerely,        MALAIKA Shukla CNP

## 2023-05-11 LAB
PATH REPORT.COMMENTS IMP SPEC: NORMAL
PATH REPORT.COMMENTS IMP SPEC: NORMAL
PATH REPORT.FINAL DX SPEC: NORMAL
PATH REPORT.GROSS SPEC: NORMAL
PATH REPORT.MICROSCOPIC SPEC OTHER STN: NORMAL
PATH REPORT.RELEVANT HX SPEC: NORMAL

## 2023-05-12 ENCOUNTER — ANCILLARY PROCEDURE (OUTPATIENT)
Dept: MAMMOGRAPHY | Facility: CLINIC | Age: 53
End: 2023-05-12
Payer: COMMERCIAL

## 2023-05-12 DIAGNOSIS — Z12.31 VISIT FOR SCREENING MAMMOGRAM: ICD-10-CM

## 2023-05-12 PROCEDURE — 77067 SCR MAMMO BI INCL CAD: CPT | Mod: TC | Performed by: STUDENT IN AN ORGANIZED HEALTH CARE EDUCATION/TRAINING PROGRAM

## 2023-05-12 PROCEDURE — 77063 BREAST TOMOSYNTHESIS BI: CPT | Mod: TC | Performed by: STUDENT IN AN ORGANIZED HEALTH CARE EDUCATION/TRAINING PROGRAM

## 2023-05-15 NOTE — TELEPHONE ENCOUNTER
Central Prior Authorization Team - Phone: [phone]     PA Initiation    Medication: liraglutide - Weight Management (SAXENDA) 18 MG/3ML pen- PA INITIATED  Insurance Company:    Pharmacy Filling the Rx: [id number] - [place], [place] - [address] AT [facility] [address]  Filling Pharmacy Phone: [phone]  Filling Pharmacy Fax:    Start Date: 5/15/2023

## 2023-05-16 NOTE — TELEPHONE ENCOUNTER
Central Prior Authorization Team - Phone: [phone]     PRIOR AUTHORIZATION DENIED    Medication: liraglutide - Weight Management (SAXENDA) 18 MG/3ML pen- PA DENIED    Denial Date: 5/15/2023    Denial Rational:                   Appeal Information:  If the provider would like to appeal, please provide a letter of medical necessity and route back to the team. Otherwise you can close the encounter. Thank you, Central PA Team

## 2023-05-17 ENCOUNTER — ANCILLARY ORDERS (OUTPATIENT)
Dept: RADIOLOGY | Facility: CLINIC | Age: 53
End: 2023-05-17

## 2023-06-15 ENCOUNTER — VIRTUAL VISIT (OUTPATIENT)
Dept: FAMILY MEDICINE | Facility: CLINIC | Age: 53
End: 2023-06-15
Attending: FAMILY MEDICINE
Payer: COMMERCIAL

## 2023-06-15 DIAGNOSIS — E66.812 OBESITY, CLASS II, BMI 35-39.9: ICD-10-CM

## 2023-06-15 PROCEDURE — 99213 OFFICE O/P EST LOW 20 MIN: CPT | Mod: VID | Performed by: FAMILY MEDICINE

## 2023-06-15 NOTE — PROGRESS NOTES
Brunilda is a 53 year old who is being evaluated via a billable video visit.      How would you like to obtain your AVS? MyChart  If the video visit is dropped, the invitation should be resent by: Text to cell phone: [phone]  Will anyone else be joining your video visit? No    Assessment & Plan     (E66.9) Obesity, Class II, BMI 35-39.9  Comment: current medication working very well but quite expensive. She does want to continue, I did refer to MTM for possible other pharmaceutical options to discuss. See HPI for past medications tried and adverse side effects.  Plan: semaglutide (OZEMPIC) 2 MG/1.5ML SOPN pen, Med         Therapy Management Referral          Jacinta Vera MD  [facility]   Brunilda is a 53 year old, presenting for the following health issues:  Recheck Medication and Medication Refill  she's been on Ozempic for 5 weeks, she has lost weight and minimal side effects, very mild nausea but managable.  Wt Readings from Last 4 Encounters:   04/21/23 81.2 kg (179 lb)   01/12/23 81.2 kg (179 lb)     She reports weight of 77 lbs.  Insurance isn't covering the medication, or Saxenda, which I smore expensive. She is getting a discount at [employer] with [facility] but still costs $900.  Metformin caused terrible heartburn. Topiramate caused grogginess.    Impaired fasting glucose Follow-up      Patient is checking blood sugars: not at all    Diabetic concerns: None     Symptoms of hypoglycemia (low blood sugar): none     Paresthesias (numbness or burning in feet) or sores: No        No results found for: A1C              6/15/2023     4:58 PM   Additional Questions   Roomed by yuval ventura   Accompanied by none         6/15/2023     4:58 PM   Patient Reported Additional Medications   Patient reports taking the following new medications none     Medication Refill    History of Present Illness       Reason for visit:  Obesity    She eats 4 or more servings of fruits and  "vegetables daily.She consumes 0 sweetened beverage(s) daily.She exercises with enough effort to increase her heart rate 30 to 60 minutes per day.  She exercises with enough effort to increase her heart rate 3 or less days per week.   She is taking medications regularly.        want to reorder semaglutide (OZEMPIC) 2 MG/1.5ML SOPN pen (      Review of Systems   Constitutional, HEENT, cardiovascular, pulmonary, GI, , musculoskeletal, neuro, skin, endocrine and psych systems are negative, except as otherwise noted.      Objective    Vitals - Patient Reported  Weight (Patient Reported): 80.3 kg (177 lb)  Height (Patient Reported): 147.3 cm (4' 10\")  BMI (Based on Pt Reported Ht/Wt): 36.99        Physical Exam   GENERAL: Healthy, alert and no distress  EYES: Eyes grossly normal to inspection.  No discharge or erythema, or obvious scleral/conjunctival abnormalities.  RESP: No audible wheeze, cough, or visible cyanosis.  No visible retractions or increased work of breathing.    SKIN: Visible skin clear. No significant rash, abnormal pigmentation or lesions.  NEURO: Cranial nerves grossly intact.  Mentation and speech appropriate for age.  PSYCH: Mentation appears normal, affect normal/bright, judgement and insight intact, normal speech and appearance well-groomed.                Video-Visit Details    Type of service:  Video Visit     Originating Location (pt. Location): Home  Distant Location (provider location):  Off-site  Platform used for Video Visit: Lorena    "

## 2023-06-21 ENCOUNTER — TELEPHONE (OUTPATIENT)
Dept: FAMILY MEDICINE | Facility: CLINIC | Age: 53
End: 2023-06-21

## 2023-06-21 NOTE — TELEPHONE ENCOUNTER
PA started for semaglutide (OZEMPIC) 2 MG/3ML pen.  Log into [email or web address] and enter info from below:    Key: [id number]  Patient last name: Tsering  : 66

## 2023-06-22 ENCOUNTER — TELEPHONE (OUTPATIENT)
Dept: FAMILY MEDICINE | Facility: CLINIC | Age: 53
End: 2023-06-22
Payer: COMMERCIAL

## 2023-06-22 NOTE — TELEPHONE ENCOUNTER
MTM referral from: [facility] visit (referral by provider)    MTM referral outreach attempt #2 on June 22, 2023 at 11:48 AM      Outcome: Patient not reachable after several attempts, will route to [facility] Pharmacist/Provider as an FYI.  [facility] scheduling number is [phone].  Thank you for the referral.    Use vbc for the carrier/Plan on the flowsheet    AKASH Cohen

## 2023-07-07 ENCOUNTER — OFFICE VISIT (OUTPATIENT)
Dept: OBGYN | Facility: CLINIC | Age: 53
End: 2023-07-07
Payer: COMMERCIAL

## 2023-07-07 VITALS
HEIGHT: 58 IN | OXYGEN SATURATION: 97 % | SYSTOLIC BLOOD PRESSURE: 100 MMHG | WEIGHT: 175.8 LBS | DIASTOLIC BLOOD PRESSURE: 72 MMHG | BODY MASS INDEX: 36.9 KG/M2 | HEART RATE: 86 BPM

## 2023-07-07 DIAGNOSIS — Z12.4 PAP SMEAR FOR CERVICAL CANCER SCREENING: ICD-10-CM

## 2023-07-07 DIAGNOSIS — N94.10 DYSPAREUNIA, FEMALE: Primary | ICD-10-CM

## 2023-07-07 PROCEDURE — G0145 SCR C/V CYTO,THINLAYER,RESCR: HCPCS | Performed by: OBSTETRICS & GYNECOLOGY

## 2023-07-07 PROCEDURE — 87624 HPV HI-RISK TYP POOLED RSLT: CPT | Performed by: OBSTETRICS & GYNECOLOGY

## 2023-07-07 PROCEDURE — 99204 OFFICE O/P NEW MOD 45 MIN: CPT | Performed by: OBSTETRICS & GYNECOLOGY

## 2023-07-07 RX ORDER — LIDOCAINE HYDROCHLORIDE 20 MG/ML
JELLY TOPICAL PRN
Qty: 30 ML | Refills: 4 | Status: SHIPPED | OUTPATIENT
Start: 2023-07-07 | End: 2024-01-19 | Stop reason: ALTCHOICE

## 2023-07-07 RX ORDER — ESTRADIOL 0.1 MG/G
2 CREAM VAGINAL DAILY
Qty: 85 G | Refills: 11 | Status: SHIPPED | OUTPATIENT
Start: 2023-07-07 | End: 2024-07-14

## 2023-07-07 ASSESSMENT — ANXIETY QUESTIONNAIRES
3. WORRYING TOO MUCH ABOUT DIFFERENT THINGS: NOT AT ALL
IF YOU CHECKED OFF ANY PROBLEMS ON THIS QUESTIONNAIRE, HOW DIFFICULT HAVE THESE PROBLEMS MADE IT FOR YOU TO DO YOUR WORK, TAKE CARE OF THINGS AT HOME, OR GET ALONG WITH OTHER PEOPLE: NOT DIFFICULT AT ALL
7. FEELING AFRAID AS IF SOMETHING AWFUL MIGHT HAPPEN: NOT AT ALL
5. BEING SO RESTLESS THAT IT IS HARD TO SIT STILL: NOT AT ALL
6. BECOMING EASILY ANNOYED OR IRRITABLE: NOT AT ALL
1. FEELING NERVOUS, ANXIOUS, OR ON EDGE: SEVERAL DAYS
GAD7 TOTAL SCORE: 2
2. NOT BEING ABLE TO STOP OR CONTROL WORRYING: NOT AT ALL
GAD7 TOTAL SCORE: 2

## 2023-07-07 ASSESSMENT — PATIENT HEALTH QUESTIONNAIRE - PHQ9
5. POOR APPETITE OR OVEREATING: SEVERAL DAYS
SUM OF ALL RESPONSES TO PHQ QUESTIONS 1-9: 0

## 2023-07-07 NOTE — Clinical Note
Please abstract the following data from this visit with this patient into the appropriate field in Epic:  Tests that can be patient reported without a hard copy:  Pap smear done on this date: 2021 (approximately), by this group: [facility], results were normal.   Other Tests found in the patient's chart through Chart Review/Care Everywhere:  {Abstract Quality List (Optional):901927}  Note to Abstraction: If this section is blank, no results were found via Chart Review/Care Everywhere.

## 2023-07-08 NOTE — PROGRESS NOTES
"CC: Painful sex  HPI:  Ameena Cagle is a 53 year old female No LMP recorded. Patient has had a hysterectomy.  Had hysterectomy but still has ovaries and cervix.    For the last several years has had painful intercourse.  Can bleed afterwards and even using a lot of lubricant is not helpful.  Also has history of vulvodynia.  Several years ago was given some estrogen tablets but those ran out.  She does take nightly Elavil to help with the vulvodynia and needs a refill.  Had been prescribed physical therapy for pelvic floor but then COVID came so she never had a chance to do that.    Allergies: Penicillins    EXAM:  Blood pressure 100/72, pulse 86, height 1.461 m (4' 9.52\"), weight 79.7 kg (175 lb 12.8 oz), SpO2 97 %.  General - pleasant female in no acute distress.  Pelvic - EG: adult female with fusion of labia minora to majora and some clitoral kapadia scarring as well, BUS: within normal limits, Vagina: atrophic no discharge, Cervix: no lesions or CMT **virginal spec used  Rectovaginal - deferred.  Psychiactric - appropriate mood and affect  Neurological - alert and oriented X 3      ASSESSMENT/PLAN:  (N94.10) Dyspareunia, female  (primary encounter diagnosis)  Comment: severe atrophy and vulvodynia   Plan: amitriptyline (ELAVIL) 25 MG tablet, lidocaine         (XYLOCAINE) 2 % external gel, Physical Therapy         Referral, estradiol (ESTRACE) 0.1 MG/GM vaginal        cream        Refill amitriptyline to help with vulvodynia.  Discussed vaginal atrophy and will use estradiol cream.  She has a set of dilators at home and given some Xylocaine gel to assist using the dilators especially before intercourse.  Referral to physical therapy also done for the pelvic floor.  Diagrams used to aid in patient understanding and questions were answered.  She will let me know if not doing better in the next 3 months.    (Z12.4) Pap smear for cervical cancer screening  Plan: Pap screen with HPV - recommended age 30 - 65         " years        Discussed sending pap smear for HPV typing as well and that if both pap and HPV are negative, then can have q5 year pap smears. Discussed stop screening at age 65.    Anahi Rucker MD

## 2023-07-11 LAB
BKR LAB AP GYN ADEQUACY: NORMAL
BKR LAB AP GYN INTERPRETATION: NORMAL
BKR LAB AP HPV REFLEX: NORMAL
BKR LAB AP PREVIOUS ABNORMAL: NORMAL
PATH REPORT.COMMENTS IMP SPEC: NORMAL
PATH REPORT.COMMENTS IMP SPEC: NORMAL
PATH REPORT.RELEVANT HX SPEC: NORMAL

## 2023-07-13 LAB
HUMAN PAPILLOMA VIRUS 16 DNA: NEGATIVE
HUMAN PAPILLOMA VIRUS 18 DNA: NEGATIVE
HUMAN PAPILLOMA VIRUS FINAL DIAGNOSIS: NORMAL
HUMAN PAPILLOMA VIRUS OTHER HR: NEGATIVE

## 2023-08-15 ENCOUNTER — MYC MEDICAL ADVICE (OUTPATIENT)
Dept: FAMILY MEDICINE | Facility: CLINIC | Age: 53
End: 2023-08-15
Payer: COMMERCIAL

## 2023-08-15 DIAGNOSIS — E66.812 OBESITY, CLASS II, BMI 35-39.9: ICD-10-CM

## 2023-08-15 DIAGNOSIS — Z51.81 ENCOUNTER FOR THERAPEUTIC DRUG MONITORING: Primary | ICD-10-CM

## 2023-08-15 NOTE — TELEPHONE ENCOUNTER
Pt was called for details. See refill request dated 8/12 . The ozempic refill request was sent to Dr Vera.     This encounter will be closed    Zahraa Ro RN

## 2023-08-16 RX ORDER — SEMAGLUTIDE 0.68 MG/ML
0.5 INJECTION, SOLUTION SUBCUTANEOUS
Qty: 3 ML | Refills: 0 | Status: SHIPPED | OUTPATIENT
Start: 2023-08-16 | End: 2023-09-10

## 2023-08-17 ENCOUNTER — TELEPHONE (OUTPATIENT)
Dept: FAMILY MEDICINE | Facility: CLINIC | Age: 53
End: 2023-08-17

## 2023-08-17 NOTE — TELEPHONE ENCOUNTER
PRIOR AUTHORIZATION DENIED    Medication: SEMAGLUTIDE(0.25 OR 0.5MG/DOS) 2 [facility]ML [facility]  Insurance Company: [employer] ([facility]) - Phone [phone] Fax [phone]  Denial Date: 8/16/2023  Denial Rational:       Appeal Information:     Patient Notified: No

## 2023-08-25 ENCOUNTER — LAB (OUTPATIENT)
Dept: LAB | Facility: CLINIC | Age: 53
End: 2023-08-25
Attending: FAMILY MEDICINE
Payer: COMMERCIAL

## 2023-08-25 DIAGNOSIS — Z51.81 ENCOUNTER FOR THERAPEUTIC DRUG MONITORING: ICD-10-CM

## 2023-08-25 DIAGNOSIS — Z11.4 SCREENING FOR HIV (HUMAN IMMUNODEFICIENCY VIRUS): Primary | ICD-10-CM

## 2023-08-25 LAB
ANION GAP SERPL CALCULATED.3IONS-SCNC: 11 MMOL/L (ref 7–15)
BUN SERPL-MCNC: 14.6 MG/DL (ref 6–20)
CALCIUM SERPL-MCNC: 9.5 MG/DL (ref 8.6–10)
CHLORIDE SERPL-SCNC: 106 MMOL/L (ref 98–107)
CREAT SERPL-MCNC: 0.87 MG/DL (ref 0.51–0.95)
DEPRECATED HCO3 PLAS-SCNC: 22 MMOL/L (ref 22–29)
GFR SERPL CREATININE-BSD FRML MDRD: 78 ML/MIN/1.73M2
GLUCOSE SERPL-MCNC: 93 MG/DL (ref 70–99)
HIV 1+2 AB+HIV1 P24 AG SERPL QL IA: NONREACTIVE
POTASSIUM SERPL-SCNC: 4.2 MMOL/L (ref 3.4–5.3)
SODIUM SERPL-SCNC: 139 MMOL/L (ref 136–145)

## 2023-08-25 PROCEDURE — 87389 HIV-1 AG W/HIV-1&-2 AB AG IA: CPT

## 2023-08-25 PROCEDURE — 80048 BASIC METABOLIC PNL TOTAL CA: CPT

## 2023-08-25 PROCEDURE — 36415 COLL VENOUS BLD VENIPUNCTURE: CPT

## 2023-08-30 ENCOUNTER — THERAPY VISIT (OUTPATIENT)
Dept: PHYSICAL THERAPY | Facility: REHABILITATION | Age: 53
End: 2023-08-30
Attending: OBSTETRICS & GYNECOLOGY
Payer: COMMERCIAL

## 2023-08-30 DIAGNOSIS — N94.10 DYSPAREUNIA, FEMALE: ICD-10-CM

## 2023-08-30 DIAGNOSIS — N94.10 DYSPAREUNIA IN FEMALE: Primary | ICD-10-CM

## 2023-08-30 PROCEDURE — 97535 SELF CARE MNGMENT TRAINING: CPT | Mod: GP

## 2023-08-30 PROCEDURE — 97161 PT EVAL LOW COMPLEX 20 MIN: CPT | Mod: GP

## 2023-08-30 NOTE — PROGRESS NOTES
PHYSICAL THERAPY EVALUATION  Type of Visit: Evaluation    See electronic medical record for Abuse and Falls Screening details.    Subjective       Presenting condition or subjective complaint: Pain during intercourse. Ongoing for several years, 3-4 years ago around the time of menopause.     Date of onset: 07/07/23 (onset date ~ 3 years ago, order date 7/7/23)    Relevant medical history: Asthma; Depression; Incontinence; Menopause; Overweight   Dates & types of surgery: Appendextomy 1991, foot surgery 2012, 2014, C- section 1995, 1999, hysterectomy 2002    Prior diagnostic imaging/testing results:       Prior therapy history for the same diagnosis, illness or injury: Yes I had one PT visit in 2020 but was unable to continue mary ann to the pandemic.    Living Environment  Social support: With a significant other or spouse   Type of home: House   Stairs to enter the home: Yes 6 Is there a railing: No   Ramp: No   Stairs inside the home: Yes 18 Is there a railing: Yes   Help at home: None  Equipment owned:       Employment: Yes   Hobbies/Interests: Walking, cooking, reading    Patient goals for therapy: Id like to be able to have intercourse without pain    Pain assessment: Pain present     Objective      PELVIC EVALUATION  ADDITIONAL HISTORY:  Sex assigned at birth: Female  Gender identity: Female    Pronouns: She/Her Hers      Bladder History:  Feels bladder filling: No  Triggers for feeling of inability to wait to go to the bathroom: Yes Coughing, sneezing, laughing  How long can you wait to urinate: 3 hours  Gets up at night to urinate: Yes 2-3  Can stop the flow of urine when urinating: Yes  Volume of urine usually released: Large   Other issues: Trouble emptying bladder completely; Dribbling after urinating  Number of bladder infections in last 12 months:    Fluid intake per day: 48 oz      Medications taken for bladder: No     Activities causing urine leak: Cough; Sneeze; Jump    Amount of urine  typically leaked: Small amount  Pads used to help with leaking: Yes I use this many pads per day: 2-3 I use this type/brand: Poise    Bowel History:  Frequency of bowel movement:    Consistency of stool: Hard    Ignores the urge to defecate: No  Other bowel issues: Pain when pooping; Straining to have bowel movement  Length of time spent trying to have a bowel movement: Varies    Sexual Function History:  Sexual orientation: Straight    Sexually active: Yes  Lubrication used: Yes Yes  Pelvic pain: Initial penetration (rectal or vaginal); Deep penetration (rectal or vaginal); Pelvic exams    Pain or difficulty with orgasms/erection/ejaculation: No    State of menopause: During menopause (I am in menopause now)  Hormone medications: Yes Estradiol 0.01%    Are you currently pregnant: No, Number of previous pregnancies: 2, Number of deliveries: 2, If you have delivered before, did you have any of these issues during delivery:  delivery, Have you been diagnosed with pelvic prolapse or abdominal separation: No, Do you get regular exercise: Yes, I do this type of exercise: Walking, strength training, Have you tried pelvic floor strengthening exercises for 4 weeks: No, Do you have any history of trauma that is relevant to your care that you d like to share: No    Discussed reason for referral regarding pelvic health needs and external/internal pelvic floor muscle examination with patient/guardian.  Opportunity provided to ask questions and verbal consent for assessment and intervention was given.    POSTURE: Standing Posture: Rounded shoulders, Forward head, Lordosis increased, Thoracic kyphosis increased  Sitting Posture: Rounded shoulders, Thoracic kyphosis increased  LUMBAR SCREEN: AROM WNL  HIP SCREEN:  Strength:  hip abduction and ER 4-/5 giselle    Functional Strength Testing: Double Leg Squat: Anterior knee translation and Improper use of glutes/hips  SLS: pelvic instability giselle    PELVIC/SI SCREEN:     PAIN  PROVOCATION TEST:   PELVIS/SI SPECIAL TESTS:   BREATHING SYMMETRY: Rib cage flaring, Decreased rib cage mobility    PELVIC EXAM  External Visual Inspection:  At rest: Elevated perineal body  With voluntary pelvic floor contraction: Perineal elevation  Relaxation of PFM: Partial/delayed relaxation  With intra-abdominal pressure: Cough: Perineal elevation  Valsalva: Perineal descent  Bearing down as defecation: No change    Integumentary:       External Digital Palpation per Perineum:   Ischiocavernosis: Tightness  Bulbo cavernosis: Tightness  Transverse perineal: Tightness  Levator ani: Tightness  Perineal body: Tightness    *more pain on the left compared to right    Internal Digital Palpation:  Per Vagina:  Tenderness  Tone: high  Digital Muscle Performance: P (Power): 2+/5  (Reactfulcock)  Compensations: Gluts  Relaxation Post-Contraction: Partial/delayed relaxation    Per Rectum:        Pelvic Organ Prolapse:   Anterior: At the level of the hymen    ABDOMINAL ASSESSMENT  Diastasis Rectus Abdominis (SIRI):      Abdominal Activation/Strength:       Fascial Tension/Restriction/Tone: Hypomobile-      Assessment & Plan   CLINICAL IMPRESSIONS  Medical Diagnosis: N94.10 (ICD-10-CM) - Dyspareunia, female,    Treatment Diagnosis: pelvic floor dysfunction, pelvic floor muscle tightness   Impression/Assessment: Patient is a 53 year old female with dyspareunia, mixed incontinence and constipation/pain with straining with BM complaints.  The following significant findings have been identified: Pain, Decreased ROM/flexibility, Decreased joint mobility, Decreased strength, Impaired muscle performance, Decreased activity tolerance, and Impaired posture. These impairments interfere with their ability to perform self care tasks and recreational activities as compared to previous level of function.     Clinical Decision Making (Complexity):  Clinical Presentation: Stable/Uncomplicated  Clinical Presentation Rationale: based on medical  and personal factors listed in PT evaluation  Clinical Decision Making (Complexity): Low complexity    PLAN OF CARE  Treatment Interventions:  Modalities: Biofeedback  Interventions: Gait Training, Manual Therapy, Neuromuscular Re-education, Therapeutic Activity, Therapeutic Exercise, Self-Care/Home Management    Long Term Goals     PT Goal 1  Goal Identifier: HEP  Goal Description: pt will demonstrate independence and report compliance with HEP to facilitate improved independent symptom mgmt.  Rationale: to maximize safety and independence with performance of ADLs and functional tasks  Target Date: 11/22/23  PT Goal 2  Goal Identifier: incontinence management  Goal Description: pt will be able to verbalize stress and urge incontinence strategies to reduce episodes of urinary leakage and improve QOL.  Rationale: to maximize safety and independence with performance of ADLs and functional tasks;to maximize safety and independence with self cares  Target Date: 11/22/23  PT Goal 3  Goal Identifier: incontinence frequency  Goal Description: pt will report 75% reduction in frequency of episodes of stress and urge incontinence to promote improved QOL.  Rationale: to maximize safety and independence with performance of ADLs and functional tasks;to maximize safety and independence with self cares  Target Date: 11/22/23  PT Goal 4  Goal Identifier: pelvic floor coordination  Goal Description: pt will demonstrate improved pelvic floor coordination via ability to perform WNL PF lift, release and excursion to faciliate reduced episodes of urinary incontinence.  Rationale: to maximize safety and independence with performance of ADLs and functional tasks;to maximize safety and independence with self cares  Target Date: 11/22/23  PT Goal 5  Goal Identifier: pelvic pain  Goal Description: pt will report ability to tolerate deep penetration with intercourse without pain to facilitate improved QOL  Rationale: to maximize safety and  independence with self cares  Target Date: 11/22/23      Frequency of Treatment: 1x/week  Duration of Treatment: up to 12 weeks    Recommended Referrals to Other Professionals:   Education Assessment:   Learner/Method: Patient    Risks and benefits of evaluation/treatment have been explained.   Patient/Family/caregiver agrees with Plan of Care.     Evaluation Time:     TASH Robledo Low Complexity Minutes (68522): 25       Signing Clinician: Anjali Melgar PT

## 2023-09-06 ENCOUNTER — THERAPY VISIT (OUTPATIENT)
Dept: PHYSICAL THERAPY | Facility: REHABILITATION | Age: 53
End: 2023-09-06
Attending: OBSTETRICS & GYNECOLOGY
Payer: COMMERCIAL

## 2023-09-06 DIAGNOSIS — N94.10 DYSPAREUNIA IN FEMALE: Primary | ICD-10-CM

## 2023-09-06 PROCEDURE — 97535 SELF CARE MNGMENT TRAINING: CPT | Mod: GP

## 2023-09-06 PROCEDURE — 97140 MANUAL THERAPY 1/> REGIONS: CPT | Mod: GP

## 2023-09-06 PROCEDURE — 97112 NEUROMUSCULAR REEDUCATION: CPT | Mod: GP

## 2023-09-10 ENCOUNTER — MYC REFILL (OUTPATIENT)
Dept: FAMILY MEDICINE | Facility: CLINIC | Age: 53
End: 2023-09-10
Payer: COMMERCIAL

## 2023-09-10 DIAGNOSIS — E66.812 OBESITY, CLASS II, BMI 35-39.9: ICD-10-CM

## 2023-09-12 PROBLEM — M17.0 PRIMARY OSTEOARTHRITIS OF BOTH KNEES: Status: ACTIVE | Noted: 2023-08-09

## 2023-09-12 RX ORDER — SEMAGLUTIDE 0.68 MG/ML
0.5 INJECTION, SOLUTION SUBCUTANEOUS
Qty: 3 ML | Refills: 4 | Status: SHIPPED | OUTPATIENT
Start: 2023-09-12 | End: 2023-10-20

## 2023-09-13 ENCOUNTER — THERAPY VISIT (OUTPATIENT)
Dept: PHYSICAL THERAPY | Facility: REHABILITATION | Age: 53
End: 2023-09-13
Attending: OBSTETRICS & GYNECOLOGY
Payer: COMMERCIAL

## 2023-09-13 DIAGNOSIS — N94.10 DYSPAREUNIA IN FEMALE: Primary | ICD-10-CM

## 2023-09-13 PROCEDURE — 97112 NEUROMUSCULAR REEDUCATION: CPT | Mod: GP

## 2023-09-13 PROCEDURE — 97140 MANUAL THERAPY 1/> REGIONS: CPT | Mod: GP

## 2023-10-13 ENCOUNTER — THERAPY VISIT (OUTPATIENT)
Dept: PHYSICAL THERAPY | Facility: REHABILITATION | Age: 53
End: 2023-10-13
Payer: COMMERCIAL

## 2023-10-13 DIAGNOSIS — N94.10 DYSPAREUNIA IN FEMALE: Primary | ICD-10-CM

## 2023-10-13 PROCEDURE — 97112 NEUROMUSCULAR REEDUCATION: CPT | Mod: GP

## 2023-10-13 PROCEDURE — 97140 MANUAL THERAPY 1/> REGIONS: CPT | Mod: GP

## 2023-10-20 ENCOUNTER — MYC MEDICAL ADVICE (OUTPATIENT)
Dept: FAMILY MEDICINE | Facility: CLINIC | Age: 53
End: 2023-10-20

## 2023-10-20 ENCOUNTER — THERAPY VISIT (OUTPATIENT)
Dept: PHYSICAL THERAPY | Facility: REHABILITATION | Age: 53
End: 2023-10-20
Payer: COMMERCIAL

## 2023-10-20 DIAGNOSIS — N94.10 DYSPAREUNIA IN FEMALE: Primary | ICD-10-CM

## 2023-10-20 DIAGNOSIS — E66.812 OBESITY, CLASS II, BMI 35-39.9: ICD-10-CM

## 2023-10-20 PROCEDURE — 90913 BFB TRAINING EA ADDL 15 MIN: CPT | Mod: GP

## 2023-10-20 PROCEDURE — 97112 NEUROMUSCULAR REEDUCATION: CPT | Mod: GP

## 2023-10-20 PROCEDURE — 90912 BFB TRAINING 1ST 15 MIN: CPT | Mod: GP

## 2023-10-20 RX ORDER — SEMAGLUTIDE 0.68 MG/ML
0.5 INJECTION, SOLUTION SUBCUTANEOUS
Qty: 3 ML | Refills: 3 | Status: SHIPPED | OUTPATIENT
Start: 2023-10-20 | End: 2024-02-26

## 2023-10-20 NOTE — TELEPHONE ENCOUNTER
Ozempic sent to new pharmacy.  MyChart message sent to patient.  Jewell BOCANEGRA RN  [facility]

## 2023-10-27 ENCOUNTER — THERAPY VISIT (OUTPATIENT)
Dept: PHYSICAL THERAPY | Facility: REHABILITATION | Age: 53
End: 2023-10-27
Payer: COMMERCIAL

## 2023-10-27 DIAGNOSIS — N94.10 DYSPAREUNIA IN FEMALE: Primary | ICD-10-CM

## 2023-10-27 PROCEDURE — 97140 MANUAL THERAPY 1/> REGIONS: CPT | Mod: GP

## 2023-10-27 PROCEDURE — 97112 NEUROMUSCULAR REEDUCATION: CPT | Mod: GP

## 2023-10-27 PROCEDURE — 97535 SELF CARE MNGMENT TRAINING: CPT | Mod: GP

## 2023-11-07 ENCOUNTER — TELEPHONE (OUTPATIENT)
Dept: FAMILY MEDICINE | Facility: CLINIC | Age: 53
End: 2023-11-07
Payer: COMMERCIAL

## 2023-11-07 NOTE — TELEPHONE ENCOUNTER
PA started for semaglutide (OZEMPIC, 0.25 OR 0.5 MG/DOSE,) 2 MG/3ML pen.  Log into [email or web address] and enter info from below:    Key: [id number]  Patient last name: LALO  : 66

## 2023-11-09 NOTE — TELEPHONE ENCOUNTER
Central Prior Authorization Team   Phone: [phone]    PA Initiation    Medication: semaglutide (OZEMPIC, 0.25 OR 0.5 MG/DOSE,) 2 MG/3ML pen, rec 11-6-23  Insurance Company: [employer] ([facility]) - Phone [phone] Fax [phone]  Pharmacy Filling the Rx: [facility] #[id number] - [place], [place] - [address]  Filling Pharmacy Phone: [phone]  Filling Pharmacy Fax:    Start Date: 11/9/2023

## 2023-11-10 NOTE — TELEPHONE ENCOUNTER
Please see original FELIPA fletcher from 8/17/23.  Did you want to complete an appeal for Ozempic for Weight loss?  Per jsutine, Ozempic is only given for Diabetes Type 2.  Carolyn Mckeon RN  [facility]

## 2023-11-10 NOTE — TELEPHONE ENCOUNTER
HI, medication highly unlikely to be authorized, I don't recommend pursuing PA.    Sincerely,  Dr. Jacinta Vera MD  11/10/2023

## 2023-11-10 NOTE — TELEPHONE ENCOUNTER
PRIOR AUTHORIZATION DENIED    Medication: semaglutide (OZEMPIC, 0.25 OR 0.5 MG/DOSE,) 2 MG/3ML pen, rec 11-6-23    Denial Date: 11/10/2023    Denial Rational: Per insurance there is already a denial on file for this medication.  This will need to go thru appeals.  Medication is only covered for a diagnosis of Type 2 diabetes.

## 2023-11-22 ENCOUNTER — ANCILLARY PROCEDURE (OUTPATIENT)
Dept: GENERAL RADIOLOGY | Facility: CLINIC | Age: 53
End: 2023-11-22
Attending: PHYSICIAN ASSISTANT
Payer: COMMERCIAL

## 2023-11-22 ENCOUNTER — OFFICE VISIT (OUTPATIENT)
Dept: URGENT CARE | Facility: URGENT CARE | Age: 53
End: 2023-11-22
Payer: COMMERCIAL

## 2023-11-22 VITALS
HEIGHT: 58 IN | BODY MASS INDEX: 33.8 KG/M2 | DIASTOLIC BLOOD PRESSURE: 76 MMHG | WEIGHT: 161 LBS | OXYGEN SATURATION: 98 % | HEART RATE: 72 BPM | TEMPERATURE: 97.6 F | SYSTOLIC BLOOD PRESSURE: 116 MMHG

## 2023-11-22 DIAGNOSIS — S69.91XA INJURY OF FINGER OF RIGHT HAND, INITIAL ENCOUNTER: ICD-10-CM

## 2023-11-22 DIAGNOSIS — S69.91XA INJURY OF FINGER OF RIGHT HAND, INITIAL ENCOUNTER: Primary | ICD-10-CM

## 2023-11-22 PROCEDURE — 99213 OFFICE O/P EST LOW 20 MIN: CPT | Performed by: PHYSICIAN ASSISTANT

## 2023-11-22 PROCEDURE — 73140 X-RAY EXAM OF FINGER(S): CPT | Mod: TC | Performed by: RADIOLOGY

## 2023-11-22 NOTE — PROGRESS NOTES
"  Assessment & Plan     Injury of finger of right hand, initial encounter    Xray finger Negative for acute findings, read by Freddy BARLOW at time of visit.  RICE treatment: Rest, Ice, compression, elevation   Motrin for inflammation and tenderness  Ice compresses  Return to activity as tolerated    sprains usually take from 2 to 10 weeks to heal, but some take longer     - XR Finger Right G/E 2 Views; Future    Review of external notes as documented elsewhere in note         BMI:   Estimated body mass index is 33.65 kg/m  as calculated from the following:    Height as of this encounter: 1.473 m (4' 10\").    Weight as of this encounter: 73 kg (161 lb).       At today's visit with Ameena Cagle , we discussed results, diagnosis, medications and formulated a plan.  We also discussed red flags for immediate return to clinic/ER, as well as indications for follow up with PCP if not improved in 3 days. Patient understood and agreed to plan. Ameena Cagle was discharged with stable vitals and has no further questions.       No follow-ups on file.    Freddy Mendoza, [facility], PA-C  [facility] [place]    Marisela Worley is a 53 year old, presenting for the following health issues:  Urgent Care and Finger (X4 weeks Pain and swelling on fourth finger right hand )      HPI     Review of Systems   Constitutional, HEENT, cardiovascular, pulmonary, gi and gu systems are negative, except as otherwise noted.      Objective    /76   Pulse 72   Temp 97.6  F (36.4  C) (Temporal)   Ht 1.473 m (4' 10\")   Wt 73 kg (161 lb)   SpO2 98%   BMI 33.65 kg/m    Body mass index is 33.65 kg/m .  Physical Exam   GENERAL: healthy, alert and no distress  MS: Pos for 4th finger PIP joint tenderness and mild swelling  SKIN: Positive for mild swelling  NEURO: Normal strength and tone, mentation intact and speech normal  PSYCH: mentation appears normal, affect normal/bright    Xray - Reviewed and " interpreted by me.  Negative for acute findings, read by Freddy BARLOW at time of visit.

## 2024-01-04 ENCOUNTER — TELEPHONE (OUTPATIENT)
Dept: FAMILY MEDICINE | Facility: CLINIC | Age: 54
End: 2024-01-04

## 2024-01-04 ENCOUNTER — E-VISIT (OUTPATIENT)
Dept: URGENT CARE | Facility: CLINIC | Age: 54
End: 2024-01-04
Payer: COMMERCIAL

## 2024-01-04 ENCOUNTER — OFFICE VISIT (OUTPATIENT)
Dept: URGENT CARE | Facility: URGENT CARE | Age: 54
End: 2024-01-04
Payer: COMMERCIAL

## 2024-01-04 VITALS
DIASTOLIC BLOOD PRESSURE: 66 MMHG | TEMPERATURE: 97.7 F | HEART RATE: 86 BPM | SYSTOLIC BLOOD PRESSURE: 106 MMHG | RESPIRATION RATE: 16 BRPM | WEIGHT: 160 LBS | HEIGHT: 58 IN | BODY MASS INDEX: 33.58 KG/M2 | OXYGEN SATURATION: 96 %

## 2024-01-04 DIAGNOSIS — R05.1 ACUTE COUGH: Primary | ICD-10-CM

## 2024-01-04 DIAGNOSIS — U07.1 INFECTION DUE TO 2019 NOVEL CORONAVIRUS: ICD-10-CM

## 2024-01-04 DIAGNOSIS — R05.2 SUBACUTE COUGH: ICD-10-CM

## 2024-01-04 DIAGNOSIS — R50.9 FEVER IN ADULT: Primary | ICD-10-CM

## 2024-01-04 PROCEDURE — 99207 PR NON-BILLABLE SERV PER CHARTING: CPT | Performed by: PHYSICIAN ASSISTANT

## 2024-01-04 PROCEDURE — 99213 OFFICE O/P EST LOW 20 MIN: CPT | Performed by: PHYSICIAN ASSISTANT

## 2024-01-04 RX ORDER — BENZONATATE 100 MG/1
100 CAPSULE ORAL 3 TIMES DAILY PRN
Qty: 30 CAPSULE | Refills: 0 | Status: SHIPPED | OUTPATIENT
Start: 2024-01-04 | End: 2024-01-14

## 2024-01-04 ASSESSMENT — ENCOUNTER SYMPTOMS
SINUS PAIN: 1
DIARRHEA: 0
SINUS PRESSURE: 1
NAUSEA: 0
CHILLS: 1
RHINORRHEA: 0
WHEEZING: 0
VOMITING: 0
CHEST TIGHTNESS: 0
HEADACHES: 1
FEVER: 1
COUGH: 1

## 2024-01-04 NOTE — PATIENT INSTRUCTIONS
Dear Ameena Cagle,    We are sorry you are not feeling well. Based on the responses you provided, it is recommended that you be seen in-person in urgent care so we can better evaluate your symptoms. Please click here to find the nearest urgent care location to you.   You will not be charged for this Visit. Thank you for trusting us with your care.    Thanh Hardin PA-C

## 2024-01-04 NOTE — TELEPHONE ENCOUNTER
She is calling from Urgent Care now in [place].She is sitting in her car in the parking lot    She states they will not see her as she had a positive covid and she did should be seen virtually    Positive test today, first symptoms were 2 weeks ago . Negative 12/22 and 12/24      The last couple days much worse and today's test is positive    Worse symptoms today cough, tired, nasal congestion ,body aches. Temp 99.5    No chest pain    Mild asthma     She was hoping to get something to help with the cough. Tessalon perles in the past haven't helped    Wants to be able to sleep better     Because she was already told to be seen in person I advised she come to the [facility]. I checked with staff here and they can seen her    Pt is willing to do this.     Zahraa Ro, RN, BSN  [facility]

## 2024-01-05 ENCOUNTER — TELEPHONE (OUTPATIENT)
Dept: PHYSICAL THERAPY | Facility: REHABILITATION | Age: 54
End: 2024-01-05
Payer: COMMERCIAL

## 2024-01-05 NOTE — PROGRESS NOTES
Assessment & Plan        1. Acute cough    - benzonatate (TESSALON) 100 MG capsule; Take 1 capsule (100 mg) by mouth 3 times daily as needed for cough  Dispense: 30 capsule; Refill: 0    2. Infection due to 2019 novel coronavirus    -Supportive care recommended (rest, adequate fluid intake and analgesics such as acetaminophen and ibuprofen)        Patient Instructions   Increase fluids with water, Gatorade, or rehydrating beverages. Alternate acetaminophen and Ibuprofen as needed for aches, pains or fever. Follow clear liquid to BRAT diet (bananas, rice, applesauce, toast) as needed for any upset stomach.  Follow up in clinic if symptoms persist or worsen. This usually can last 10-14 days.       Return if symptoms worsen or fail to improve, for Follow up.    At the end of the encounter, I discussed results, diagnosis, medications. Discussed red flags for immediate return to clinic/ER, as well as indications for follow up if no improvement. Patient understood and agreed to plan. Patient was stable for discharge.    Marisela Worley is a 53 year old female who presents to clinic today  for the following health issues:  Chief Complaint   Patient presents with    Urgent Care    Covid Concern     Was sick before Lucy and tested neg and was getting better but started feeling sick again on 1/2/24. Would like something to help with cough, not really interested in Paxlovid      HPI    Patient reports COVID-19 infection.  Symptoms started 2 days ago.  She reports she had a cold more than a week ago around [place].  She tested negative for COVID at that time.  She reports fever, chills, congestion, runny nose, sinus pain and pressure.  She is not interested in the Paxlovid treatment.  She wants cough medication to help with the cough.  She reports cough is worse at night.    Review of Systems   Constitutional:  Positive for chills and fever.   HENT:  Positive for sinus pressure and sinus pain. Negative for  "congestion and rhinorrhea.    Respiratory:  Positive for cough. Negative for chest tightness and wheezing.    Cardiovascular:  Negative for chest pain.   Gastrointestinal:  Negative for diarrhea, nausea and vomiting.   Neurological:  Positive for headaches.       Problem List:  2023-08: Dyspareunia in female  2023-08: Primary osteoarthritis of both knees  2023-01: History of gestational diabetes  2023-01: Obesity, Class II, BMI 35-39.9      Past Medical History:   Diagnosis Date    History of colonic polyps        Social History     Tobacco Use    Smoking status: Never    Smokeless tobacco: Never   Substance Use Topics    Alcohol use: Not Currently     Alcohol/week: 1.0 standard drink of alcohol     Types: 1 Standard drinks or equivalent per week           Objective    /66   Pulse 86   Temp 97.7  F (36.5  C) (Temporal)   Resp 16   Ht 1.473 m (4' 10\")   Wt 72.6 kg (160 lb)   SpO2 96%   BMI 33.44 kg/m    Physical Exam  Constitutional:       Appearance: Normal appearance.   HENT:      Head: Normocephalic.      Mouth/Throat:      Pharynx: Uvula midline.   Cardiovascular:      Rate and Rhythm: Normal rate and regular rhythm.   Pulmonary:      Effort: Pulmonary effort is normal.      Breath sounds: Normal breath sounds.   Neurological:      Mental Status: She is alert.   Psychiatric:         Mood and Affect: Mood normal.         Behavior: Behavior normal.              Thuy Caraballo PA-C    "

## 2024-01-05 NOTE — TELEPHONE ENCOUNTER
Left voicemail for patient regarding 4 canceled physical therapy appointments. Informed pt that she still has 4 visits remaining, and instructed her to call to confirm remaining appointments or cancel remaining appointments if she no longer needs therapy.     Anjali Melgar, PT

## 2024-01-05 NOTE — PATIENT INSTRUCTIONS
Increase fluids with water, Gatorade, or rehydrating beverages. Alternate acetaminophen and Ibuprofen as needed for aches, pains or fever. Follow clear liquid to BRAT diet (bananas, rice, applesauce, toast) as needed for any upset stomach.  Follow up in clinic if symptoms persist or worsen. This usually can last 10-14 days.

## 2024-01-11 ENCOUNTER — THERAPY VISIT (OUTPATIENT)
Dept: PHYSICAL THERAPY | Facility: REHABILITATION | Age: 54
End: 2024-01-11
Payer: COMMERCIAL

## 2024-01-11 DIAGNOSIS — N94.10 DYSPAREUNIA IN FEMALE: Primary | ICD-10-CM

## 2024-01-11 PROCEDURE — 97140 MANUAL THERAPY 1/> REGIONS: CPT | Mod: GP

## 2024-01-11 PROCEDURE — 97535 SELF CARE MNGMENT TRAINING: CPT | Mod: GP

## 2024-01-11 NOTE — PROGRESS NOTES
PLAN  Continue therapy per current plan of care.  Extended POC by 12 weeks as progress limited by 2 month gap in care.    Beginning/End Dates of Progress Note Reporting Period:  01/11/24 to 01/11/2024    Referring Provider:  Anahi Rucker       01/11/24 0500   Appointment Info   Signing clinician's name / credentials Anjali Melgar, PT, DPT   Total/Authorized Visits up to 12   Visits Used 7   Medical Diagnosis N94.10 (ICD-10-CM) - Dyspareunia, female,   PT Tx Diagnosis pelvic floor dysfunction, pelvic floor muscle tightness   Other pertinent information Discussed reason for referral regarding pelvic health needs and external/internal pelvic floor muscle examination with patient/guardian.  Opportunity provided to ask questions and verbal consent for assessment and intervention was given.   Progress Note/Certification   Onset of illness/injury or Date of Surgery 07/07/23  (onset date ~ 3 years ago, order date 7/7/23)   Therapy Frequency 1x/week decreasing to every other week   Predicted Duration up to 12 weeks; 12 additional weeks   Progress Note Due Date 02/15/24   Progress Note Completed Date 01/11/24   GOALS   PT Goals 2;3;4;5   PT Goal 1   Goal Identifier HEP   Goal Description pt will demonstrate independence and report compliance with HEP to facilitate improved independent symptom mgmt.   Rationale to maximize safety and independence with performance of ADLs and functional tasks   Goal Progress in progress   Target Date 02/15/24   PT Goal 2   Goal Identifier incontinence management   Goal Description pt will be able to verbalize stress and urge incontinence strategies to reduce episodes of urinary leakage and improve QOL.   Rationale to maximize safety and independence with performance of ADLs and functional tasks;to maximize safety and independence with self cares   Goal Progress met   Target Date 11/22/23   Date Met 10/13/23   PT Goal 3   Goal Identifier incontinence frequency   Goal Description pt  will report 75% reduction in frequency of episodes of stress and urge incontinence to promote improved QOL.   Rationale to maximize safety and independence with performance of ADLs and functional tasks;to maximize safety and independence with self cares   Goal Progress met   Target Date 11/22/23   Date Met 10/13/23   PT Goal 4   Goal Identifier pelvic floor coordination   Goal Description pt will demonstrate improved pelvic floor coordination via ability to perform WNL PF lift, release and excursion to faciliate reduced episodes of urinary incontinence.   Rationale to maximize safety and independence with performance of ADLs and functional tasks;to maximize safety and independence with self cares   Goal Progress in progress   Target Date 02/15/24   PT Goal 5   Goal Identifier pelvic pain   Goal Description pt will report ability to tolerate deep penetration with intercourse without pain to facilitate improved QOL   Rationale to maximize safety and independence with self cares   Goal Progress in progress   Target Date 02/15/24   Subjective Report   Subjective Report Feels like she hasn't made progress. Still on size 4 dilator.   Attempted size 5 dilator, some bleeding and 5/10. Sometimes unable to insert size 5 dilator. 2-3 times per week with dilator.   Objective Measures   Objective Measures Objective Measure 1;Objective Measure 2   Objective Measure 1   Objective Measure pelvic floor coordination assessment   Details able to contract and relax pelvic floor, delayed with relaxing   Objective Measure 2   Objective Measure pelvic floor palpation   Details most tenderness at perineal body as well as superficial transverse perineal muscles L>R, ongoing pain and increased resistance throughout bulbocavernosus, ischiocavernosus and puborectalis, LA and obturator internus giselle   Treatment Interventions (PT)   Interventions Self Care/Home Management;Manual Therapy   Therapeutic Procedure/Exercise   Ther Proc 1 child's pose,  extended   Ther Proc 1 - Details did not perform   PTRx Ther Proc 1 Happy Baby   PTRx Ther Proc 1 - Details did not perform   PTRx Ther Proc 2 Pretzel Stretch   PTRx Ther Proc 2 - Details did not perform   PTRx Ther Proc 3 Piriformis Stretch Above 90 Degress Supine   PTRx Ther Proc 3 - Details did not perform   Skilled Intervention initiated HEP for hip stretching   Neuromuscular Re-education   Neuro Re-ed 1 pelvic floor coordination   Neuro Re-ed 1 - Details pt in hooklying: therapist gloved and lubricated finger palpating layer 3 PFM. PFM excursion going from kegel, rest, reverse kegel, rest and repeat.  improved ability to achieve further range with upward and downward excursion   Patient Response/Progress biofeedback revealing improved coordination with PFM quieting after reverse kegels   Manual Therapy   Manual Therapy: Mobilization, MFR, MLD, friction massage minutes (19681) 21   Manual Therapy Manual Therapy 2;Manual Therapy 3   Manual Therapy 1 myofascial release   Manual Therapy 1 - Details pt in hooklying: myofascial release throughout perineal body   Manual Therapy 2 trigger point release   Manual Therapy 2 - Details pt in hooklying: ischiocavernosus and bulbocavernosus giselle, more resistance noted on L vs R   Skilled Intervention manual therapy to reduce PFM tension to improve muscle function   Patient Response/Progress 5/10 pain at most, feels like perineal body is where she has most discomfort with dilator and intercourse   Self Care/home Management   ADL/Home Mgmt Training (39707) 23   Self Care Self Care 2;Self Care 3;Self Care 4;Self Care 5;Self Care 6   Self Care 1 stretches   Self Care 1 - Details encouraged pt to return to performing stretches with HEP   Self Care 2 myofascial work-self-treatment technique   Self Care 2 - Details instructed in how to perform myofascial release througout perineal body- review of pelvic floor anatomy and technique   Self Care 3 dilator   Self Care 3 - Details  increase frequency of use to daily. start with breathing 1-2 mintues, myofascial work and finish with dilator x 10 minutes. try to progress to size 5 using this sequence   Self Care 4 tips to improve muscle relaxation with intercourse   Self Care 4 - Details educated that orgasm prior to penetrative intercourse can lead to less pain as muscles are more relaxed   Skilled Intervention education for self-care to continue to progress toward dyspareunia goal   Education   Learner/Method Patient   Plan   Home program modified dilator routine, added self treatment for myofascial release, return to stretches   Updates to plan of care extended POC by 12 weeks as progress limited by 2 month gap in care. maintain frequency of 1x/week and reduce to every other week when indicated   Plan for next session manual work, how is dilator and myofascial work going independently?   Comments   Comments Pt returns to physical therapy after ~ 2 months. Pt notes incontinence continues to be well managed, but dyspareunia is unchanged and pt has not progressed with dilators and is unable to tolerate penetrative intercourse. Reassessment reveals increased tissue resistance and reproduction of pain at perineal body. HEP and dilator recommendations modified today to address this. Pt would continue to benefit from skilled physical therapy.   Total Session Time   Timed Code Treatment Minutes 44   Total Treatment Time (sum of timed and untimed services) 44

## 2024-01-17 ENCOUNTER — THERAPY VISIT (OUTPATIENT)
Dept: PHYSICAL THERAPY | Facility: REHABILITATION | Age: 54
End: 2024-01-17
Payer: COMMERCIAL

## 2024-01-17 DIAGNOSIS — N94.10 DYSPAREUNIA IN FEMALE: Primary | ICD-10-CM

## 2024-01-17 PROCEDURE — 97140 MANUAL THERAPY 1/> REGIONS: CPT | Mod: GP

## 2024-01-19 ENCOUNTER — OFFICE VISIT (OUTPATIENT)
Dept: FAMILY MEDICINE | Facility: CLINIC | Age: 54
End: 2024-01-19
Payer: COMMERCIAL

## 2024-01-19 VITALS
SYSTOLIC BLOOD PRESSURE: 109 MMHG | HEIGHT: 57 IN | BODY MASS INDEX: 34.02 KG/M2 | HEART RATE: 84 BPM | TEMPERATURE: 96.9 F | RESPIRATION RATE: 18 BRPM | OXYGEN SATURATION: 96 % | DIASTOLIC BLOOD PRESSURE: 75 MMHG | WEIGHT: 157.7 LBS

## 2024-01-19 DIAGNOSIS — Z12.11 SCREEN FOR COLON CANCER: ICD-10-CM

## 2024-01-19 DIAGNOSIS — B35.1 ONYCHOMYCOSIS: ICD-10-CM

## 2024-01-19 DIAGNOSIS — Z00.00 ROUTINE GENERAL MEDICAL EXAMINATION AT A HEALTH CARE FACILITY: Primary | ICD-10-CM

## 2024-01-19 DIAGNOSIS — J45.30 MILD PERSISTENT ASTHMA WITHOUT COMPLICATION: ICD-10-CM

## 2024-01-19 DIAGNOSIS — F33.1 MODERATE RECURRENT MAJOR DEPRESSION (H): ICD-10-CM

## 2024-01-19 DIAGNOSIS — Z13.1 SCREENING FOR DIABETES MELLITUS: ICD-10-CM

## 2024-01-19 DIAGNOSIS — R13.10 DIFFICULTY SWALLOWING LIQUIDS: ICD-10-CM

## 2024-01-19 DIAGNOSIS — Z13.6 CARDIOVASCULAR SCREENING; LDL GOAL LESS THAN 160: ICD-10-CM

## 2024-01-19 DIAGNOSIS — F41.1 GENERALIZED ANXIETY DISORDER: ICD-10-CM

## 2024-01-19 LAB — HBA1C MFR BLD: 4.8 % (ref 0–5.6)

## 2024-01-19 PROCEDURE — 80048 BASIC METABOLIC PNL TOTAL CA: CPT | Performed by: FAMILY MEDICINE

## 2024-01-19 PROCEDURE — 80061 LIPID PANEL: CPT | Performed by: FAMILY MEDICINE

## 2024-01-19 PROCEDURE — 90636 HEP A/HEP B VACC ADULT IM: CPT | Performed by: FAMILY MEDICINE

## 2024-01-19 PROCEDURE — 36415 COLL VENOUS BLD VENIPUNCTURE: CPT | Performed by: FAMILY MEDICINE

## 2024-01-19 PROCEDURE — 90471 IMMUNIZATION ADMIN: CPT | Performed by: FAMILY MEDICINE

## 2024-01-19 PROCEDURE — 99396 PREV VISIT EST AGE 40-64: CPT | Mod: 25 | Performed by: FAMILY MEDICINE

## 2024-01-19 PROCEDURE — 83036 HEMOGLOBIN GLYCOSYLATED A1C: CPT | Performed by: FAMILY MEDICINE

## 2024-01-19 PROCEDURE — 99214 OFFICE O/P EST MOD 30 MIN: CPT | Mod: 25 | Performed by: FAMILY MEDICINE

## 2024-01-19 RX ORDER — TERBINAFINE HYDROCHLORIDE 250 MG/1
250 TABLET ORAL DAILY
Qty: 180 TABLET | Refills: 0 | Status: SHIPPED | OUTPATIENT
Start: 2024-01-19 | End: 2024-07-17

## 2024-01-19 RX ORDER — ESCITALOPRAM OXALATE 10 MG/1
10 TABLET ORAL DAILY
Qty: 90 TABLET | Refills: 3 | Status: SHIPPED | OUTPATIENT
Start: 2024-01-19

## 2024-01-19 ASSESSMENT — ENCOUNTER SYMPTOMS
FREQUENCY: 0
MYALGIAS: 0
HEMATURIA: 0
NERVOUS/ANXIOUS: 0
HEMATOCHEZIA: 0
HEADACHES: 1
WEAKNESS: 0
DIZZINESS: 0
NAUSEA: 0
SHORTNESS OF BREATH: 0
DIARRHEA: 0
CONSTIPATION: 0
JOINT SWELLING: 1
EYE PAIN: 0
COUGH: 0
HEARTBURN: 0
CHILLS: 0
ARTHRALGIAS: 1
PALPITATIONS: 0
DYSURIA: 0
ABDOMINAL PAIN: 0
SORE THROAT: 0
FEVER: 0
PARESTHESIAS: 0

## 2024-01-19 ASSESSMENT — ASTHMA QUESTIONNAIRES: ACT_TOTALSCORE: 25

## 2024-01-19 ASSESSMENT — PATIENT HEALTH QUESTIONNAIRE - PHQ9
SUM OF ALL RESPONSES TO PHQ QUESTIONS 1-9: 2
SUM OF ALL RESPONSES TO PHQ QUESTIONS 1-9: 2
10. IF YOU CHECKED OFF ANY PROBLEMS, HOW DIFFICULT HAVE THESE PROBLEMS MADE IT FOR YOU TO DO YOUR WORK, TAKE CARE OF THINGS AT HOME, OR GET ALONG WITH OTHER PEOPLE: NOT DIFFICULT AT ALL

## 2024-01-19 NOTE — LETTER
My Asthma Action Plan    Name: Ameena Cagle   YOB: 1970  Date: 1/19/2024   My doctor: Jacinta Vera MD   My clinic: [facility]        My Control Medicine: Fluticasone furoate (Arnuity Ellipta) -  100 mcg one puff daily  My Rescue Medicine: Albuterol (Proair/Ventolin/Proventil HFA) 2-4 puffs EVERY 4 HOURS as needed. Use a spacer if recommended by your provider.   My Asthma Severity:   Mild Persistent  Know your asthma triggers               GREEN ZONE   Good Control  I feel good  No cough or wheeze  Can work, sleep and play without asthma symptoms       Take your asthma control medicine every day.     If exercise triggers your asthma, take your rescue medication  15 minutes before exercise or sports, and  During exercise if you have asthma symptoms  Spacer to use with inhaler: If you have a spacer, make sure to use it with your inhaler             YELLOW ZONE Getting Worse  I have ANY of these:  I do not feel good  Cough or wheeze  Chest feels tight  Wake up at night   Keep taking your Green Zone medications  Start taking your rescue medicine:  every 20 minutes for up to 1 hour. Then every 4 hours for 24-48 hours.  If you stay in the Yellow Zone for more than 12-24 hours, contact your doctor.  If you do not return to the Green Zone in 12-24 hours or you get worse, start taking your oral steroid medicine if prescribed by your provider.           RED ZONE Medical Alert - Get Help  I have ANY of these:  I feel awful  Medicine is not helping  Breathing getting harder  Trouble walking or talking  Nose opens wide to breathe       Take your rescue medicine NOW  If your provider has prescribed an oral steroid medicine, start taking it NOW  Call your doctor NOW  If you are still in the Red Zone after 20 minutes and you have not reached your doctor:  Take your rescue medicine again and  Call 911 or go to the emergency room right away    See your regular doctor within 2  weeks of an Emergency Room or Urgent Care visit for follow-up treatment.          Annual Reminders:  Meet with Asthma Educator,  Flu Shot in the Fall, consider Pneumonia Vaccination for patients with asthma (aged 19 and older).    Pharmacy:    [facility] - [address] [id number] - [place], [place] - [address] AT [facility] [address]    Electronically signed by Jacinta Vera MD   Date: 01/19/24                      Asthma Triggers  How To Control Things That Make Your Asthma Worse    Triggers are things that make your asthma worse.  Look at the list below to help you find your triggers and what you can do about them.  You can help prevent asthma flare-ups by staying away from your triggers.      Trigger                                                          What you can do   Cigarette Smoke  Tobacco smoke can make asthma worse. Do not allow smoking in your home, car or around you.  Be sure no one smokes at a child s day care or school.  If you smoke, ask your health care provider for ways to help you quit.  Ask family members to quit too.  Ask your health care provider for a referral to Quit Plan to help you quit smoking, or call 7-254-175-PLAN.     Colds, Flu, Bronchitis  These are common triggers of asthma. Wash your hands often.  Don t touch your eyes, nose or mouth.  Get a flu shot every year.     Dust Mites  These are tiny bugs that live in cloth or carpet. They are too small to see. Wash sheets and blankets in hot water every week.   Encase pillows and mattress in dust mite proof covers.  Avoid having carpet if you can. If you have carpet, vacuum weekly.   Use a dust mask and HEPA vacuum.   Pollen and Outdoor Mold  Some people are allergic to trees, grass, or weed pollen, or molds. Try to keep your windows closed.  Limit time out doors when pollen count is high.   Ask you health care provider about taking medicine during allergy season.      Animal Dander  Some people are allergic to skin flakes, urine or saliva from pets with fur or feathers. Keep pets with fur or feathers out of your home.    If you can t keep the pet outdoors, then keep the pet out of your bedroom.  Keep the bedroom door closed.  Keep pets off cloth furniture and away from stuffed toys.     Mice, Rats, and Cockroaches   Some people are allergic to the waste from these pests.   Cover food and garbage.  Clean up spills and food crumbs.  Store grease in the refrigerator.   Keep food out of the bedroom.   Indoor Mold  This can be a trigger if your home has high moisture. Fix leaking faucets, pipes, or other sources of water.   Clean moldy surfaces.  Dehumidify basement if it is damp and smelly.   Smoke, Strong Odors, and Sprays  These can reduce air quality. Stay away from strong odors and sprays, such as perfume, powder, hair spray, paints, smoke incense, paint, cleaning products, candles and new carpet.   Exercise or Sports  Some people with asthma have this trigger. Be active!  Ask your doctor about taking medicine before sports or exercise to prevent symptoms.    Warm up for 5-10 minutes before and after sports or exercise.     Other Triggers of Asthma  Cold air:  Cover your nose and mouth with a scarf.  Sometimes laughing or crying can be a trigger.  Some medicines and food can trigger asthma.

## 2024-01-19 NOTE — NURSING NOTE
Prior to immunization administration, verified patients identity using patient s name and date of birth. Please see Immunization Activity for additional information.     Screening Questionnaire for Adult Immunization    Are you sick today?   No   Do you have allergies to medications, food, a vaccine component or latex?   Yes   Have you ever had a serious reaction after receiving a vaccination?   No   Do you have a long-term health problem with heart, lung, kidney, or metabolic disease (e.g., diabetes), asthma, a blood disorder, no spleen, complement component deficiency, a cochlear implant, or a spinal fluid leak?  Are you on long-term aspirin therapy?   No   Do you have cancer, leukemia, HIV/AIDS, or any other immune system problem?   No   Do you have a parent, brother, or sister with an immune system problem?   No   In the past 3 months, have you taken medications that affect  your immune system, such as prednisone, other steroids, or anticancer drugs; drugs for the treatment of rheumatoid arthritis, Crohn s disease, or psoriasis; or have you had radiation treatments?   No   Have you had a seizure, or a brain or other nervous system problem?   No   During the past year, have you received a transfusion of blood or blood    products, or been given immune (gamma) globulin or antiviral drug?   No   For women: Are you pregnant or is there a chance you could become       pregnant during the next month?   No   Have you received any vaccinations in the past 4 weeks?   No     Immunization questionnaire was positive for at least one answer.  Notified Dr. Vera .      Patient instructed to remain in clinic for 15 minutes afterwards, and to report any adverse reactions.     Screening performed by Cammy Hui MA on 1/19/2024 at 8:39 AM.

## 2024-01-19 NOTE — RESULT ENCOUNTER NOTE
Hello! Thank you for getting labs done. Your lab test to check for diabetes, HgA1C, also called glycosylated hemoglobin, which measures the level of sugar in your blood over the past few months, is normal which is great!     Congratulations, you don't have diabetes!  However, since your fasting blood sugars have been high in the past, I will continue to screen your blood sugar every year.     If you have any questions, please contact the clinic or schedule an appointment with me, thank you!    Sincerely,    DENILSON IVY MD   1/19/2024

## 2024-01-19 NOTE — PROGRESS NOTES
Preventive Care Visit  [facility]  Jacinta Vera MD, Family Medicine  Jan 19, 2024       SUBJECTIVE:   Brunilda is a 53 year old, presenting for the following: physical, chronic disease management and acute concerns  Physical  Swallowing concern  Brunilda reports several years of intermittent difficulty swallowing liquids, describes a feeling as if throat is closing up, coughs enough sometimes that she vomits, notices producing more phlegm. No difficulty ever swallowing food.    Toenail fungus  Both great nails involved, not responding to over the counter treatments. Sx started one month ago. New concern for her.    Depression and Anxiety Follow-Up  How are you doing with your depression since your last visit? No change  How are you doing with your anxiety since your last visit?  No change  Are you having other symptoms that might be associated with depression or anxiety? No  Have you had a significant life event? No   Do you have any concerns with your use of alcohol or other drugs? No    Social History     Tobacco Use    Smoking status: Never    Smokeless tobacco: Never   Vaping Use    Vaping Use: Never used   Substance Use Topics    Alcohol use: Not Currently     Alcohol/week: 1.0 standard drink of alcohol     Types: 1 Standard drinks or equivalent per week    Drug use: Never         7/7/2023     8:32 AM 1/19/2024     7:45 AM   PHQ   PHQ-9 Total Score 0 2   Q9: Thoughts of better off dead/self-harm past 2 weeks Not at all Not at all         7/7/2023     8:32 AM   ANGELICA-7 SCORE   Total Score 2     Suicide Assessment Five-step Evaluation and Treatment (SAFE-T)    Asthma Follow-Up    Was ACT completed today?  Yes        1/19/2024     8:50 AM   ACT Total Scores   ACT TOTAL SCORE (Goal Greater than or Equal to 20) 25   In the past 12 months, how many times did you visit the emergency room for your asthma without being admitted to the hospital? 0   In the past 12 months, how many times were  you hospitalized overnight because of your asthma? 0        How many days per week do you miss taking your asthma controller medication?  0  Please describe any recent triggers for your asthma: None  Have you had any Emergency Room Visits, Urgent Care Visits, or Hospital Admissions since your last office visit?  No        1/19/2024     7:59 AM   Additional Questions   Roomed by phi   Accompanied by self     Healthy Habits:     Getting at least 3 servings of Calcium per day:  Yes    Bi-annual eye exam:  Yes    Dental care twice a year:  Yes    Sleep apnea or symptoms of sleep apnea:  None    Diet:  Regular (no restrictions)    Frequency of exercise:  2-3 days/week    Duration of exercise:  30-45 minutes    Taking medications regularly:  Yes    Medication side effects:  None    Additional concerns today:  No    Today's PHQ-9 Score:       1/19/2024     7:45 AM   PHQ-9 SCORE   PHQ-9 Total Score MyChart 2 (Minimal depression)   PHQ-9 Total Score 2         Social History     Tobacco Use    Smoking status: Never    Smokeless tobacco: Never   Substance Use Topics    Alcohol use: Not Currently     Alcohol/week: 1.0 standard drink of alcohol     Types: 1 Standard drinks or equivalent per week             1/19/2024     7:47 AM   Alcohol Use   Prescreen: >3 drinks/day or >7 drinks/week? No     Reviewed orders with patient.  Reviewed health maintenance and updated orders accordingly - Yes  BP Readings from Last 3 Encounters:   01/19/24 109/75   01/04/24 106/66   11/22/23 116/76    Wt Readings from Last 3 Encounters:   01/19/24 71.5 kg (157 lb 11.2 oz)   01/04/24 72.6 kg (160 lb)   11/22/23 73 kg (161 lb)                  Current Outpatient Medications   Medication Sig Dispense Refill    albuterol (PROAIR HFA/PROVENTIL HFA/VENTOLIN HFA) 108 (90 Base) MCG/ACT inhaler       amitriptyline (ELAVIL) 25 MG tablet Take 1 tablet (25 mg) by mouth At Bedtime 90 tablet 4    escitalopram (LEXAPRO) 10 MG tablet Take 1 tablet (10 mg) by mouth  daily 90 tablet 3    estradiol (ESTRACE) 0.1 MG/GM vaginal cream Place 2 g vaginally daily For 2 weeks then 2-3 times weekly (Patient taking differently: Place vaginally daily For 2 weeks then 2-3 times weekly) 85 g 11    fluticasone (ARNUITY ELLIPTA) 100 MCG/ACT inhaler Inhale 1 puff into the lungs daily 90 each 3    semaglutide (OZEMPIC, 0.25 OR 0.5 MG/DOSE,) 2 MG/3ML pen Inject 0.5 mg Subcutaneous every 7 days 3 mL 3    terbinafine (LAMISIL) 250 MG tablet Take 1 tablet (250 mg) by mouth daily for 180 days 180 tablet 0    valACYclovir (VALTREX) 500 MG tablet Take 1 tablet (500 mg) by mouth 2 times daily for 3 days 18 tablet 4     Allergies   Allergen Reactions    Penicillins Rash       Breast Cancer Screenin/12/2023     6:56 AM   Breast CA Risk Assessment (FHS-7)   Do you have a family history of breast, colon, or ovarian cancer? No / Unknown     Mammogram Screening: Recommended mammography every 1-2 years with patient discussion and risk factor consideration  Pertinent mammograms are reviewed under the imaging tab.    History of abnormal Pap smear: NO - age 30-65 PAP every 5 years with negative HPV co-testing recommended      Latest Ref Rng & Units 2023     8:54 AM   PAP / HPV   PAP  Negative for Intraepithelial Lesion or Malignancy (NILM)    HPV 16 DNA Negative Negative    HPV 18 DNA Negative Negative    Other HR HPV Negative Negative      Reviewed and updated as needed this visit by clinical staff   Tobacco  Allergies  Meds  Problems             Reviewed and updated as needed this visit by Provider      Problems             Past Medical History:   Diagnosis Date    History of colonic polyps       Past Surgical History:   Procedure Laterality Date    APPENDECTOMY  1991    laparoscopic    BUNIONECTOMY LOU BILATERAL Bilateral 2012    and      SECTION  1995    HYSTERECTOMY  2001    menometrorhagia, no malignancy, ovaries, cervix remain     OB  "History    Para Term  AB Living   2 2 0 2 0 2   SAB IAB Ectopic Multiple Live Births   0 0 0 0 2      # Outcome Date GA Lbr Jamarcus/2nd Weight Sex Delivery Anes PTL Lv   2  99 36w0d  3.118 kg (6 lb 14 oz) F CS-LTranv   CHINYERE      Birth Comments: bed rest for 5 months,  labor, csection when lungs werwe mature      Name: Carmella Anderson  95 36w0d  2.863 kg (6 lb 5 oz) M CS-LTranv  Y CHINYERE      Birth Comments: partial placental abruption at 28 weeks, bedrest, failure to progress with labor      Name: Gerardo     Review of Systems   Constitutional:  Negative for chills and fever.   HENT:  Negative for congestion, ear pain, hearing loss and sore throat.    Eyes:  Negative for pain and visual disturbance.   Respiratory:  Negative for cough and shortness of breath.    Cardiovascular:  Negative for chest pain and palpitations.   Gastrointestinal:  Negative for abdominal pain, constipation, diarrhea and nausea.   Genitourinary:  Negative for dysuria, frequency, genital sores, hematuria and urgency.   Musculoskeletal:  Positive for arthralgias and joint swelling. Negative for myalgias.   Skin:  Negative for rash.   Neurological:  Positive for headaches. Negative for dizziness and weakness.   Psychiatric/Behavioral:  The patient is not nervous/anxious.      OBJECTIVE:   /75 (BP Location: Right arm, Patient Position: Sitting, Cuff Size: Adult Regular)   Pulse 84   Temp 96.9  F (36.1  C) (Temporal)   Resp 18   Ht 1.455 m (4' 9.28\")   Wt 71.5 kg (157 lb 11.2 oz)   SpO2 96%   BMI 33.79 kg/m     Estimated body mass index is 33.79 kg/m  as calculated from the following:    Height as of this encounter: 1.455 m (4' 9.28\").    Weight as of this encounter: 71.5 kg (157 lb 11.2 oz).  Physical Exam  GENERAL: alert and no distress  EYES: Eyes grossly normal to inspection, PERRL and conjunctivae and sclerae normal  HENT: ear canals and TM's normal, nose and mouth without ulcers or " lesions  NECK: no adenopathy, no asymmetry, masses, or scars  RESP: lungs clear to auscultation - no rales, rhonchi or wheezes  CV: regular rate and rhythm, normal S1 S2, no S3 or S4, no murmur, click or rub, no peripheral edema  ABDOMEN: soft, nontender, no hepatosplenomegaly, no masses and bowel sounds normal  MS: no gross musculoskeletal defects noted, no edema  SKIN: no suspicious lesions or rashes  NEURO: Normal strength and tone, mentation intact and speech normal  PSYCH: mentation appears normal, affect normal/bright  LYMPH: no cervical, supraclavicular, axillary, or inguinal adenopathy    Diagnostic Test Results:  Labs reviewed in Epic    ASSESSMENT/PLAN:   (Z00.00) Routine general medical examination at a health care facility  (primary encounter diagnosis)    (R13.10) Difficulty swallowing liquids  Comment: New, previously undiagnosed problem with uncertain prognosis and additional work-up planned.   Differential includes neuromuscular disease, pharyngeal pouch, anxiety or other concern  Refer to ST for video swallow  Plan: XR Video Swallow with SLP or OT - Order with         Speech Therapy Referral, Speech Therapy         Referral        Will fu as indicated.     (B35.1) Onychomycosis  Comment: new concern, bilateral great toenails, not responding to over the counter home treatments.  Start oral daily medication as below and continue topical treatment (nightly foot soaks)  Lfts reviewed,  normal  Plan: terbinafine (LAMISIL) 250 MG tablet       (F41.1) Generalized anxiety disorder  and  (F33.1) Moderate recurrent major depression (H)  Comment: At goal  The current medical regimen is effective;  continue present plan and medications.      Plan: escitalopram (LEXAPRO) 10 MG tablet, Basic         metabolic panel  (Ca, Cl, CO2, Creat, Gluc, K,         Na, BUN)          (J45.30) Mild persistent asthma without complication  Comment: At goal  The current medical regimen is effective;  continue present plan and  "medications.    Needed controller inhaler changed due to insurance coverage changes  Plan: fluticasone (ARNUITY ELLIPTA) 100 MCG/ACT         inhaler          Screening tests ordered as below:    (Z13.6) CARDIOVASCULAR SCREENING; LDL GOAL LESS THAN 160  Plan: Lipid panel reflex to direct LDL Fasting    (Z13.1) Screening for diabetes mellitus  Plan: Hemoglobin A1c    (Z12.11) Screen for colon cancer  She's had colonoscopies done at [facility], will follow up with them and will provide me colonoscopy report when done    Patient has been advised of split billing requirements and indicates understanding: Yes      Counseling  Reviewed preventive health counseling, as reflected in patient instructions      BMI  Estimated body mass index is 33.79 kg/m  as calculated from the following:    Height as of this encounter: 1.455 m (4' 9.28\").    Weight as of this encounter: 71.5 kg (157 lb 11.2 oz).   Weight management plan: see AVS for lifestyle recommendations      She reports that she has never smoked. She has never used smokeless tobacco.          Signed Electronically by: Jacinta Vera MD  Answers submitted by the patient for this visit:  Patient Health Questionnaire (Submitted on 1/19/2024)  If you checked off any problems, how difficult have these problems made it for you to do your work, take care of things at home, or get along with other people?: Not difficult at all  PHQ9 TOTAL SCORE: 2  Annual Preventive Visit (Submitted on 1/19/2024)  Chief Complaint: Annual Exam:  Blood in stool: No  heartburn: No  peripheral edema: No  mood changes: No  Skin sensation changes: No    "

## 2024-01-19 NOTE — PATIENT INSTRUCTIONS
Toenails:  I recommend soaking feet in an acidic solution (tea tree oil, apple cider or white vinegar, hydrogen peroxide) nightly for 20 minutes and take the  medication daily.    Preventive Health Recommendations  Female Ages 50 - 64    Yearly exam: See your health care provider every year in order to  Review health changes.   Discuss preventive care.    Review your medicines if your doctor has prescribed any.    Get a Pap test with HPV screening every 5 years.   Get a mammogram yearly.  Have a colonoscopy or home test for colon cancer starting age 45.    Have a cholesterol test every 5 years, or more often if advised.  Have a diabetes test (fasting glucose) every three years. If you are at risk for diabetes, you should have this test more often.   If you are at risk for osteoporosis (brittle bone disease), think about having a bone density scan (DEXA).    Shots: Get a flu shot each year. Get a tetanus shot every 10 years.    Nutrition:   Eat at least 5 servings of fruits and vegetables each day.  Eat whole-grain bread, whole-wheat pasta and brown rice instead of white grains and rice.  Get adequate Calcium and Vitamin D.     Lifestyle  Exercise at least 150 minutes a week (30 minutes a day, 5 days a week). This will help you control your weight and prevent disease.  Limit alcohol to one drink per day.  No smoking.   Wear sunscreen to prevent skin cancer.   See your dentist every six months for an exam and cleaning.  See your eye doctor every 1 to 2 years.

## 2024-01-20 LAB
ANION GAP SERPL CALCULATED.3IONS-SCNC: 9 MMOL/L (ref 7–15)
BUN SERPL-MCNC: 11 MG/DL (ref 6–20)
CALCIUM SERPL-MCNC: 9.5 MG/DL (ref 8.6–10)
CHLORIDE SERPL-SCNC: 106 MMOL/L (ref 98–107)
CHOLEST SERPL-MCNC: 225 MG/DL
CREAT SERPL-MCNC: 0.74 MG/DL (ref 0.51–0.95)
DEPRECATED HCO3 PLAS-SCNC: 25 MMOL/L (ref 22–29)
EGFRCR SERPLBLD CKD-EPI 2021: >90 ML/MIN/1.73M2
FASTING STATUS PATIENT QL REPORTED: YES
GLUCOSE SERPL-MCNC: 81 MG/DL (ref 70–99)
HDLC SERPL-MCNC: 49 MG/DL
LDLC SERPL CALC-MCNC: 152 MG/DL
NONHDLC SERPL-MCNC: 176 MG/DL
POTASSIUM SERPL-SCNC: 4.4 MMOL/L (ref 3.4–5.3)
SODIUM SERPL-SCNC: 140 MMOL/L (ref 135–145)
TRIGL SERPL-MCNC: 120 MG/DL

## 2024-01-29 ENCOUNTER — VIRTUAL VISIT (OUTPATIENT)
Dept: FAMILY MEDICINE | Facility: CLINIC | Age: 54
End: 2024-01-29
Payer: COMMERCIAL

## 2024-01-29 DIAGNOSIS — J06.9 UPPER RESPIRATORY TRACT INFECTION, UNSPECIFIED TYPE: Primary | ICD-10-CM

## 2024-01-29 PROCEDURE — 99213 OFFICE O/P EST LOW 20 MIN: CPT | Mod: 95 | Performed by: NURSE PRACTITIONER

## 2024-01-29 NOTE — PROGRESS NOTES
Brunilda is a 53 year old who is being evaluated via a billable video visit.      How would you like to obtain your AVS? MyChart  If the video visit is dropped, the invitation should be resent by: Text to cell phone: [phone]  Will anyone else be joining your video visit? No          Assessment & Plan     Upper respiratory tract infection, unspecified type  Re-exposure; viral illness; will check viral swab today; discussed treatment w/paxlovid, tamiflu if positive; treatment timeframe discussed; continue symptomatic cares follow up if no improvement or worsen condition in 2-3 weeks;  - Symptomatic Influenza A/B, RSV, & SARS-CoV2 PCR (COVID-19) Nose                  Subjective   Brunilda is a 53 year old, presenting for the following health issues:  URI (URI cold )      1/29/2024     4:03 PM   Additional Questions   Roomed by Le See     53 year old  year old female with PMH   Patient Active Problem List   Diagnosis Code    History of gestational diabetes Z86.32    Obesity, Class II, BMI 35-39.9 E66.9    Dyspareunia in female N94.10    Primary osteoarthritis of both knees M17.0    Difficulty swallowing liquids R13.10    Onychomycosis B35.1    Generalized anxiety disorder F41.1    Mild persistent asthma without complication J45.30    CARDIOVASCULAR SCREENING; LDL GOAL LESS THAN 160 Z13.6    Moderate recurrent major depression (H) F33.1     in clinic for acute office visit related to/establish care/to discuss     - recent positive covid no treatment with antiviral ; 15jan started feeling better; negative covid test;  recent travel [place]-25[place] out of state; new onset symptoms feve/chills, headache, nasal congestion, cough; myalgia started on 1/26/24;  covid tested today negative;   Taking OTC Dayquil and Nyquil; tessalon Perles as needed. Ibuprofen for fever; denies shortness of breath, wheezing;     URI    History of Present Illness       Reason for visit:  Cough/ flu symptoms returning after COVID recovery  Symptom  onset:  1-3 days ago  Symptoms include:  Fever, cough, congestion.  Symptom intensity:  Moderate  Symptom progression:  Worsening  Had these symptoms before:  Yes  What makes it worse:  Talking  What makes it better:  Tea, cough medicine,, ibuprofen    She eats 4 or more servings of fruits and vegetables daily.She consumes 0 sweetened beverage(s) daily.She exercises with enough effort to increase her heart rate 30 to 60 minutes per day.  She exercises with enough effort to increase her heart rate 4 days per week.   She is taking medications regularly.                   Objective           Vitals:  No vitals were obtained today due to virtual visit.    Physical Exam   GENERAL: alert and no distress  EYES: Eyes grossly normal to inspection.  No discharge or erythema, or obvious scleral/conjunctival abnormalities.  RESP: dry harsh cough  SKIN: Visible skin clear. No significant rash, abnormal pigmentation or lesions.  NEURO: Cranial nerves grossly intact.  Mentation and speech appropriate for age.  PSYCH: Appropriate affect, tone, and pace of words          Video-Visit Details    Type of service:  Video Visit   Joined the call at 1/29/2024, 4:27:49 pm.  Left the call at 1/29/2024, 4:39:20 pm.  You were on the call for 11 minutes 30 seconds .  Originating Location (pt. Location): Home    Distant Location (provider location):  On-site  Platform used for Video Visit: Lorena  Signed Electronically by: MALAIKA Ch CNP

## 2024-01-30 ENCOUNTER — LAB (OUTPATIENT)
Dept: LAB | Facility: CLINIC | Age: 54
End: 2024-01-30
Attending: NURSE PRACTITIONER
Payer: COMMERCIAL

## 2024-01-30 LAB
FLUAV RNA SPEC QL NAA+PROBE: POSITIVE
FLUBV RNA RESP QL NAA+PROBE: NEGATIVE
RSV RNA SPEC NAA+PROBE: NEGATIVE
SARS-COV-2 RNA RESP QL NAA+PROBE: NEGATIVE

## 2024-01-30 PROCEDURE — 87637 SARSCOV2&INF A&B&RSV AMP PRB: CPT | Performed by: NURSE PRACTITIONER

## 2024-01-30 NOTE — RESULT ENCOUNTER NOTE
Results discussed directly with patient while patient was present. Any further details documented in the note.   MALAIKA Ch CNP

## 2024-02-02 NOTE — RESULT ENCOUNTER NOTE
Thank you very much for getting labs done!     Your glucose level, electrolyte level and kidney function, measured with a test called the basic metabolic panel, is normal, which is great news!      Thank you for getting your cholesterol checked! Your triglycerides are excellent and your LDL is at goal. Your total cholesterol is a little high and your HDL is a little too low.    Desired or goal levels are:  CHOLESTEROL: Desirable is less than 200.  HDL (Good Cholesterol): Desirable is greater than 40 in men and greater than 50 in women.  LDL (Bad Cholesterol): Desirable is less than 160  TRIGLYCERIDES: Desirable is less than 150.    Consider starting or increasing your aerobic activity; this is the best way to improve HDL (good) cholesterol. Exercise for at least 30 min 5 times per week, and lift weights 2-3 times per week.    As you may know, abnormal cholesterol is one factor that increases your risk for heart disease and stroke. You can improve your cholesterol by controlling the amount and type of fat you eat and by increasing your daily activity level.    Here are some ways to improve your nutrition (adapted from the American Academy of [employer] handout):  Eat less fat (especially butter, Crisco and other saturated fats)  Buy lean cuts of meat; reduce your portions of red meat or substitute poultry or fish, or avoid meat altogether.  Use skim milk and low-fat dairy products  Eat no more than 4 egg yolks per week  Avoid fried or fast foods that are high in fat  Eat more fruits and vegetables, trying to make your plate of food at least half non-starchy vegetables.      If you have any questions, please contact the clinic or schedule an appointment with me, thank you!      Sincerely,  Dr. Jacinta Vera MD  2/2/2024

## 2024-02-09 ENCOUNTER — THERAPY VISIT (OUTPATIENT)
Dept: PHYSICAL THERAPY | Facility: REHABILITATION | Age: 54
End: 2024-02-09
Payer: COMMERCIAL

## 2024-02-09 DIAGNOSIS — N94.10 DYSPAREUNIA IN FEMALE: Primary | ICD-10-CM

## 2024-02-09 PROCEDURE — 97112 NEUROMUSCULAR REEDUCATION: CPT | Mod: GP

## 2024-02-09 PROCEDURE — 97140 MANUAL THERAPY 1/> REGIONS: CPT | Mod: GP

## 2024-02-16 NOTE — PROGRESS NOTES
Physician results biometric form completed today. Completed, signed forms placed in MA basket today to be faxed. See scanned copy.    Sincerely,  Dr. Jacinta Vera MD  2/16/2024    1:59 PM

## 2024-02-19 ENCOUNTER — MYC MEDICAL ADVICE (OUTPATIENT)
Dept: FAMILY MEDICINE | Facility: CLINIC | Age: 54
End: 2024-02-19
Payer: COMMERCIAL

## 2024-02-19 NOTE — PROGRESS NOTES
Found form in faxed folder  We do not have JACKIE to fax this out, although someone faxed it.   Per patient it was not received. Updating patient on policies for [facility] and attaching via MyC for her to get wherever it needs to go-

## 2024-02-26 DIAGNOSIS — E66.812 OBESITY, CLASS II, BMI 35-39.9: ICD-10-CM

## 2024-02-27 ENCOUNTER — MYC MEDICAL ADVICE (OUTPATIENT)
Dept: FAMILY MEDICINE | Facility: CLINIC | Age: 54
End: 2024-02-27
Payer: COMMERCIAL

## 2024-02-27 RX ORDER — SEMAGLUTIDE 0.68 MG/ML
0.5 INJECTION, SOLUTION SUBCUTANEOUS
Qty: 3 ML | Refills: 3 | Status: SHIPPED | OUTPATIENT
Start: 2024-02-27

## 2024-02-28 ENCOUNTER — TELEPHONE (OUTPATIENT)
Dept: FAMILY MEDICINE | Facility: CLINIC | Age: 54
End: 2024-02-28
Payer: COMMERCIAL

## 2024-02-28 NOTE — LETTER
My Depression Action Plan  Name: Ameena Cagle   Date of Birth 1970  Date: 3/15/2024    My doctor: Jacinta Vera   My clinic: [address]  [place] [address]  [phone]            GREEN    ZONE   Good Control    What it looks like:   Things are going generally well. You have normal ups and downs. You may even feel depressed from time to time, but bad moods usually last less than a day.   What you need to do:  Continue to care for yourself (see self care plan)  Check your depression survival kit and update it as needed  Follow your physician s recommendations including any medication.  Do not stop taking medication unless you consult with your physician first.             YELLOW         ZONE Getting Worse    What it looks like:   Depression is starting to interfere with your life.   It may be hard to get out of bed; you may be starting to isolate yourself from others.  Symptoms of depression are starting to last most all day and this has happened for several days.   You may have suicidal thoughts but they are not constant.   What you need to do:     Call your care team. Your response to treatment will improve if you keep your care team informed of your progress. Yellow periods are signs an adjustment may need to be made.     Continue your self-care.  Just get dressed and ready for the day.  Don't give yourself time to talk yourself out of it.    Talk to someone in your support network.    Open up your Depression Self-Care Plan/Wellness Kit.             RED    ZONE Medical Alert - Get Help    What it looks like:   Depression is seriously interfering with your life.   You may experience these or other symptoms: You can t get out of bed most days, can t work or engage in other necessary activities, you have trouble taking care of basic hygiene, or basic responsibilities, thoughts of suicide or death that will not go away,  self-injurious behavior.     What you need to do:  Call your care team and request a same-day appointment. If they are not available (weekends or after hours) call your local crisis line, emergency room or 911.          Depression Self-Care Plan / Wellness Kit    Many people find that medication and therapy are helpful treatments for managing depression. In addition, making small changes to your everyday life can help to boost your mood and improve your wellbeing. Below are some tips for you to consider. Be sure to talk with your medical provider and/or behavioral health consultant if your symptoms are worsening or not improving.     Sleep   Sleep hygiene  means all of the habits that support good, restful sleep. It includes maintaining a consistent bedtime and wake time, using your bedroom only for sleeping or sex, and keeping the bedroom dark and free of distractions like a computer, smartphone, or television.     Develop a Healthy Routine  Maintain good hygiene. Get out of bed in the morning, make your bed, brush your teeth, take a shower, and get dressed. Don t spend too much time viewing media that makes you feel stressed. Find time to relax each day.    Exercise  Get some form of exercise every day. This will help reduce pain and release endorphins, the  feel good  chemicals in your brain. It can be as simple as just going for a walk or doing some gardening, anything that will get you moving.      Diet  Strive to eat healthy foods, including fruits and vegetables. Drink plenty of water. Avoid excessive sugar, caffeine, alcohol, and other mood-altering substances.     Stay Connected with Others  Stay in touch with friends and family members.    Manage Your Mood  Try deep breathing, massage therapy, biofeedback, or meditation. Take part in fun activities when you can. Try to find something to smile about each day.     Psychotherapy  Be open to working with a therapist if your provider recommends it.      Medication  Be sure to take your medication as prescribed. Most anti-depressants need to be taken every day. It usually takes several weeks for medications to work. Not all medicines work for all people. It is important to follow-up with your provider to make sure you have a treatment plan that is working for you. Do not stop your medication abruptly without first discussing it with your provider.    Crisis Resources   These hotlines are for both adults and children. They and are open 24 hours a day, 7 days a week unless noted otherwise.    National Suicide Prevention Lifeline   988 or 7-926-291-AYFD (9581)    Crisis Text Line    www.crisistextline.org  Text HOME to 436576 from anywhere in the [place], anytime, about any type of crisis. A live, trained crisis counselor will receive the text and respond quickly.    [facility] Lifeline for LGBTQ Youth  A national crisis intervention and suicide lifeline for LGBTQ youth under 25. Provides a safe place to talk without judgement. Call 1-370.418.5617; text START to 161752 or visit www.thetrevorproject.org to talk to a trained counselor.    For [facility] crisis numbers, visit the [facility] website at:  https://[place].gov/dhs/people-we-serve/adults/health-care/mental-health/resources/crisis-contacts.jsp

## 2024-02-28 NOTE — TELEPHONE ENCOUNTER
Prior Authorization Retail Medication Request    Medication/Dose: semaglutide (OZEMPIC, 0.25 OR 0.5 MG/DOSE,) 2 MG/3ML pen   Diagnosis and ICD code (if different than what is on RX):  Obesity, Class II, BMI 35-39.9     Insurance   Primary: [facility] COMMERCIAL   Insurance ID:  [id number]     Pharmacy Information (if different than what is on RX)  Name:  [facility]  Phone:  [phone]   Fax: [phone]

## 2024-02-29 ENCOUNTER — TELEPHONE (OUTPATIENT)
Dept: FAMILY MEDICINE | Facility: CLINIC | Age: 54
End: 2024-02-29
Payer: COMMERCIAL

## 2024-02-29 NOTE — TELEPHONE ENCOUNTER
S-(situation): PT called in thinking she has the wrong medication dose.    B-(background): Pt states she use to take 0.5mg of her medication and when she went to  her prescription from [facility] the medication dose was 2mg/8ml. Pt believes she has the wrong dose.     A-(assessment): the chart states that this medication was sent to the pts pharmacy of choice:    Disp Refills Start End EDUARDO   semaglutide (OZEMPIC, 0.25 OR 0.5 MG/DOSE,) 2 MG/3ML pen            R-(recommendations): Advised pt to bring in medication pen to the clinic and have a nurse look at it to see if we can figure out the dose. And or then the nurse/Dr can order a new prescription if needed. Pt was in agreement with this plan and has no other questions or concerns at this time.

## 2024-02-29 NOTE — TELEPHONE ENCOUNTER
Tern calling to confirm semaglutide rx dated 2/27/23.  Confirmed dispense and sig to [facility] pharmacist Peter.    2 mg in 3 ml not 8 ml, and 0.5 mg dosing.    Jewell BOCANEGRA RN  [facility]

## 2024-03-06 ENCOUNTER — HOSPITAL ENCOUNTER (OUTPATIENT)
Dept: RADIOLOGY | Facility: HOSPITAL | Age: 54
Discharge: HOME | End: 2024-03-06
Attending: FAMILY MEDICINE
Payer: COMMERCIAL

## 2024-03-06 ENCOUNTER — HOSPITAL ENCOUNTER (OUTPATIENT)
Dept: SPEECH THERAPY | Facility: HOSPITAL | Age: 54
Discharge: HOME | End: 2024-03-06
Attending: FAMILY MEDICINE
Payer: COMMERCIAL

## 2024-03-06 DIAGNOSIS — R13.10 DIFFICULTY SWALLOWING LIQUIDS: ICD-10-CM

## 2024-03-06 PROCEDURE — 92610 EVALUATE SWALLOWING FUNCTION: CPT | Mod: GN

## 2024-03-06 PROCEDURE — 92611 MOTION FLUOROSCOPY/SWALLOW: CPT | Mod: GN

## 2024-03-06 PROCEDURE — 74230 X-RAY XM SWLNG FUNCJ C+: CPT

## 2024-03-06 NOTE — PROGRESS NOTES
SPEECH LANGUAGE PATHOLOGY EVALUATION    See electronic medical record for Abuse and Falls Screening details.    Subjective   Presenting condition or subjective complaint:  coughing with liquids  Date of onset:  a couple years ago  Prior diagnostic imaging/testing results:     n/a  Prior therapy history for the same diagnosis, illness or injury:    n/a       Objective       SWALLOW EVALUATION  Dysphagia history: Patient reports intermittent coughing with thin liquids. This can result in asthma like coughing episode with emesis. Occasional hoarse voice in the morning and sensation that her tongue is burning. No respiratory issues other than asthma. Difficulty swallowing pills. Sometimes has the sensation of fullness and inability to take another sip or bite.  Current Diet/Method of Nutritional Intake: thin liquids (level 0), regular diet        CLINICAL SWALLOW EVALUATION    Oral Motor Function: Dentition: adequate dentition  Secretion management: WFL  Mucosal quality: good  Mandibular function: intact  Oral labial function: WFL  Lingual function: WFL  Velar function: intact   Buccal function: intact  Laryngeal function: throat clear, volitional swallow, voicing WFL     Level of assist required for feeding: no assistance needed   Textures Trialed:   Clinical Swallow Eval: Thin Liquids  Mode of presentation: cup   Volume presented: 1 sip  Preparatory Phase: WFL  Oral Phase: WFL  Pharyngeal phase of swallow: intact     Clinical Swallow Evaluation completed prior to VFSS via clinical interview, EMR review, and oral motor examination. Oral motor function is WFL.     VIDEOFLUOROSCOPIC SWALLOW STUDY  Radiologist: Dr. Saez  Views Taken: left lateral   Physical location of procedure: Ricco's    VFSS textures trialed:   VFSS Eval: Thin Liquids  Mode of Presentation: cup, straw   Order of Presentation: 1,2,3  Preparatory Phase: WFL  Oral Phase: WFL  Bolus Location When Swallow Initiated: posterior angle of  ramus  Pharyngeal Phase: WFL  Rosenbeck's Penetration Aspiration Scale: 1 - no aspiration, contrast does not enter airway  Diagnostic Statement: WNL    VFSS Eval: Solids  Mode of Presentation: spoon   Order of Presentation: 4  Preparatory Phase: WFL  Oral Phase: WFL  Bolus Location When Swallow Initiated: posterior angle of ramus  Pharyngeal Phase: WFL   Rosenbeck s Penetration Aspiration Scale: 1 - no aspiration, contrast does not enter airway  Diagnostic Statement: WNL    VFSS Eval: Pill  Mode of Presentation: cup  Order of Presentation: 5 A-P  Preparatory Phase: WFL  Oral Phase: WFL  Bolus Location When Swallow Initiated: n/a  Pharyngeal Phase: WFL   Rosenbeck s Penetration Aspiration Scale: 1 - no aspiration, contrast does not enter airway  Diagnostic Statement: WNL, see radiology note for details of esophageal sweep.    ESOPHAGEAL PHASE OF SWALLOW  patient reports symptoms of esophageal dysphagia  patient presents with symptoms of esophageal dysphagia  please refer to radiologist's report for details.      SWALLOW ASSESSMENT CLINICAL IMPRESSIONS AND RATIONALE  Diet Consistency Recommendations: thin liquids (level 0), regular diet    Recommended Feeding/Eating Techniques: alternate food and liquid intake, maintain upright posture during/after eating for 30 minutes, add moisture to dry foods as needed   Medication Administration Recommendations: One at a time, in puree as needed    Assessment & Plan   CLINICAL IMPRESSIONS   Impression/Assessment: Video Swallow Study completed. Patient had no aspiration or penetration. Oropharyngeal swallow function is WNL. Tongue base retraction is WNL. Pharyngeal constriction, hyolaryngeal elevation and excursion were all WNL. Epiglottic inversion is complete. Swallow response is timely. Mastication is safe and complete. Cricopharyngeal function is WFL, prominent cricopharyngeus. Strategies and written handout provided.       PLAN OF CARE    Recommended Referrals to Other  Professionals: Gastroenterology consultation for further assessment of esophageal function.     Education Assessment:   Learner/Method: Patient;Listening;Pictures/Video;No Barriers to Learning    Risks and benefits of evaluation/treatment have been explained.   Patient/Family/caregiver agrees with Plan of Care.     Evaluation Time:    SLP Eval: oral/pharyngeal swallow function, clinical minutes (12353): 15  SLP Eval: VideoFluoroscopic Swallow function Minutes (05789): 10      Signing Clinician: RAHEEL Duarte    [facility]                                                                                   OUTPATIENT SPEECH LANGUAGE PATHOLOGY

## 2024-03-07 DIAGNOSIS — R93.3 ABNORMAL BARIUM SWALLOW: ICD-10-CM

## 2024-03-07 DIAGNOSIS — R13.10 DIFFICULTY SWALLOWING LIQUIDS: Primary | ICD-10-CM

## 2024-03-07 DIAGNOSIS — R93.3 ABNORMAL BARIUM SWALLOW: Primary | ICD-10-CM

## 2024-03-07 DIAGNOSIS — R13.10 DIFFICULTY SWALLOWING LIQUIDS: ICD-10-CM

## 2024-03-07 NOTE — RESULT ENCOUNTER NOTE
Hello!  Thank you for getting the barium swallow study done. It does look like there is an area of your esophagus where food is getting stuck. I recommend you see a GI specialist for further evaluation, and that you get an upper endoscopy (scope of your throat and esophagus).     I have put referral in for both the consult and the study. The schedulers will call you soon to get these appointments scheduled for you.     Please let me know if you have any questions!    Sincerely,  Dr. Jacinta Vera MD  3/7/2024

## 2024-03-12 NOTE — TELEPHONE ENCOUNTER
Central Prior Authorization Team - Phone: [phone]     PA Initiation    Medication: OZEMPIC (0.25 OR 0.5 MG/DOSE) 2 MG/3ML SC SOPN  Insurance Company: [employer] ([facility]) - Phone [phone] Fax [phone]  Pharmacy Filling the Rx: [id number] - [place], [place] - [address]   Filling Pharmacy Phone: [phone]  Filling Pharmacy Fax:    Start Date: 3/12/2024

## 2024-03-13 ENCOUNTER — HOSPITAL ENCOUNTER (OUTPATIENT)
Facility: AMBULATORY SURGERY CENTER | Age: 54
End: 2024-03-13
Attending: INTERNAL MEDICINE | Admitting: INTERNAL MEDICINE
Payer: COMMERCIAL

## 2024-03-13 ENCOUNTER — TELEPHONE (OUTPATIENT)
Dept: GASTROENTEROLOGY | Facility: CLINIC | Age: 54
End: 2024-03-13
Payer: COMMERCIAL

## 2024-03-13 ENCOUNTER — TELEPHONE (OUTPATIENT)
Dept: FAMILY MEDICINE | Facility: CLINIC | Age: 54
End: 2024-03-13
Payer: COMMERCIAL

## 2024-03-13 NOTE — TELEPHONE ENCOUNTER
Attempted to contact patient in order to complete pre assessment questions.     No answer. Left message to return call to [phone] option 4    Missed call communication sent via Typemock.      Procedure details:    Patient scheduled for Upper endoscopy (EGD) on 3/20/24.     Arrival time: 0945. Procedure time 1045    Pre op exam needed? N/A    [facility] [address] [place] [address], [address] Floor, [place] [address]    Sedation type: Conscious sedation     Indication for procedure: difficulty swallowing liquids, abnormal barium swallow study      Chart review:     Electronic implanted devices? No    Recent diagnosis of diverticulitis within the last 6 weeks? N/A    Diabetic? No    Medication review:    Anticoagulants? No    NSAIDS? No NSAID medications per patient's medication list.  RN will verify with pre-assessment call.    Other medication HOLDING recommendations:  Weight loss injectable medication: Ozempic (Semaglutide). Weekly dosing of medication.  Hold 7 days before procedure.  Follow up with managing provider.       Prep for procedure:     Prep instructions sent via Typemock.       Tracie Collado RN  Endoscopy Procedure Pre Assessment RN

## 2024-03-13 NOTE — TELEPHONE ENCOUNTER
Pre assessment completed for upcoming procedure.   (Please see previous telephone encounter notes for complete details)    Patient  returned call.       Procedure details:    Arrival time and facility location reviewed.    Pre op exam needed? N/A    Designated  policy reviewed. Instructed to have someone stay 6  hours post procedure.       Medication review:    Medications reviewed. Please see supporting documentation below. Holding recommendations discussed (if applicable).   NSAID medication(s): Ibuprofen (Advil, Motrin): HOLD 1 day before procedure.      Prep for procedure:     Procedure prep instructions reviewed.        Any additional information needed:  N/A      Patient  verbalized understanding and had no questions or concerns at this time.      Adilene Sainz RN  Endoscopy Procedure Pre Assessment RN  [phone] option 4

## 2024-03-13 NOTE — TELEPHONE ENCOUNTER
REFERRAL INFORMATION:  Referring Provider:  Jacinta Vera MD   Referring Clinic:  [place]   Reason for Visit/Diagnosis: Abnormal barium swallow      FUTURE VISIT INFORMATION:  Appointment Date:   Appointment Time:      NOTES STATUS DETAILS   OFFICE NOTE from Referring Provider Internal 1/19/2024 OV with KIERSTEN Vera   OFFICE NOTE from Other Specialist N/A    HOSPITAL DISCHARGE SUMMARY/  ED VISITS N/A    OPERATIVE REPORT Internal 3/6/2024 Video Swallow Study   MEDICATION LIST N/A         ENDOSCOPY  N/A 3/20/2024    COLONOSCOPY N/A    IMAGING (CT, MRI, EGD, MRCP, Small Bowel Follow Through/SBT, MR/CT Enterography) N/A

## 2024-03-13 NOTE — TELEPHONE ENCOUNTER
Prior Authorization Retail Medication Request    Medication/Dose: PA Requesting Ozempic 0.25 or 0.5mg/dose 2mg/3ml pen injectors.   Diagnosis and ICD code (if different than what is on RX):  Obesity, Class II, BMI 35-39.9 [E66.9]     Insurance   Primary: [employer]ERCIAL   Insurance ID:  [id number]     Pharmacy Information (if different than what is on RX)  Name:  [facility] [place] [address]   Phone:  [phone]  Fax:[phone]

## 2024-03-13 NOTE — TELEPHONE ENCOUNTER
Caller: Ameena Cagle     Reason for Reschedule/Cancellation   (please be detailed, any staff messages or encounters to note?): no       Prior to reschedule please review:  Ordering Provider: Chuy  Sedation Determined: moderate  Does patient have any ASC Exclusions, please identify?: n      Notes on Cancelled Procedure:  Procedure: Upper Endoscopy [EGD]   Date: 3/20  Location: [facility]; [address], [address] Floor, [place] [address]  Surgeon: Leventhal      Rescheduled: Yes,   Procedure: Upper Endoscopy [EGD]    Date: 3/29   Location: [facility]; [address], [address]rd Floor, [place] [address]    Surgeon: Araseli   Sedation Level Scheduled  moderate ,  Reason for Sedation Level ordered   Instructions updated and sent: y     Does patient need PAC or Pre -Op Rescheduled? : no       Did you cancel or rescheduled an EUS procedure? No.

## 2024-03-13 NOTE — TELEPHONE ENCOUNTER
"Endoscopy Scheduling Screen    Have you had a positive Covid test in the last 14 days?  No    What is your communication preference for Instructions and/or Bowel Prep?   MyChart    What insurance is in the chart?  Other:  [facility]     Ordering/Referring Provider:     DENILSON IVY      (If ordering provider performs procedure, schedule with ordering provider unless otherwise instructed. )    BMI: Estimated body mass index is 33.79 kg/m  as calculated from the following:    Height as of 1/19/24: 1.455 m (4' 9.28\").    Weight as of 1/19/24: 71.5 kg (157 lb 11.2 oz).     Sedation Ordered  moderate sedation.   If patient BMI > 50 do not schedule in ASC.    If patient BMI > 45 do not schedule at [facility].    Are you taking methadone or Suboxone?  No    Have you had difficulties, pain, or discomfort during past endoscopy procedures?  No    Are you taking any prescription medications for pain 3 or more times per week?   NO, No RN review required.    Do you have a history of malignant hyperthermia?  No    (Females) Are you currently pregnant?   No     Have you been diagnosed or told you have pulmonary hypertension?   No    Do you have an LVAD?  No    Have you been told you have moderate to severe sleep apnea?  No    Have you been told you have COPD, asthma, or any other lung disease?  Yes     What breathing problems do you have?  Asthma     Do you use home oxygen?  No    Have your breathing problems required an ED visit or hospitalization in the last year?  No    Do you have any heart conditions?  No     Have you ever had or are you waiting for an organ transplant?  No. Continue scheduling, no site restrictions.    Have you had a stroke or transient ischemic attack (TIA aka \"mini stroke\" in the last 6 months?   No    Have you been diagnosed with or been told you have cirrhosis of the liver?   No    Are you currently on dialysis?   No    Do you need assistance transferring?   No    BMI: Estimated body mass index is 33.79 " Popliteal artery embolectomy, leftlower extremity four compartment fasciotomy, possible angiogram, possible femoral-distal bypass (L), Aortogram Operative Note     Date: 2023  OR Location: [facility] OR    Name: Mukesh Garg, : 1950, Age: 73 y.o., MRN: [id number], Sex: male    Diagnosis  Pre-op Diagnosis     * Acute lower limb ischemia [I99.8] Post-op Diagnosis     * Acute lower limb ischemia [I99.8]     Procedures  Left femoral cutdown and exposure with iliofemoral thromboembolectomy  Left popliteal cutdown and exposure with femoropopliteal thromboembolectomy  Left tibioperoneal trunk cutdown and exposure with tibial thromboembolectomy  Selective angiography of the left lower extremity      Surgeons      * Monika Pruett - Primary    Resident/Fellow/Other Assistant:  Surgeon(s) and Role:     * Moises Burroughs MD - Assisting     * Roberth Beth MD - Fellow    Procedure Summary  Anesthesia: General  ASA: III  Anesthesia Staff: Anesthesiologist: Leah Burrows MD  CRNA: MISTY Patel-CRNA  Anesthesia Resident: Carlos Dimas MD  Estimated Blood Loss: 300 mL  Intra-op Medications: * Intraprocedure medication information is unavailable because the case start and end events have not been set *           Anesthesia Record               Intraprocedure I/O Totals          Intake    Propofol Drip 0.00 mL    The total shown is the total volume documented since Anesthesia Start was filed.    lactated Ringer's 1800.00 mL    Total Intake 1800 mL       Output    Urine 150 mL    Est. Blood Loss 200 mL    Total Output 350 mL       Net    Net Volume 1450 mL          Specimen:   ID Type Source Tests Collected by Time   1 : LEFT LEG CLOT Tissue CLOT/THROMBUS SURGICAL PATHOLOGY EXAM Monika Preutt MD 2023 1230        Staff:   Circulator: Nasim Costello RN; Pavan Cabrera RN  Relief Scrub: Monse Leon RN  Scrub Person: Pavan Cabrera RN         Drains and/or Catheters:   Urethral Catheter Non-latex  "kg/m  as calculated from the following:    Height as of 1/19/24: 1.455 m (4' 9.28\").    Weight as of 1/19/24: 71.5 kg (157 lb 11.2 oz).     Is patients BMI > 40 and scheduling location [facility]?  No    Do you take an injectable medication for weight loss or diabetes (excluding insulin)? - ozempic   Yes, hold time can be up to 7 days. Please consult with you prescribing provider to discuss endoscopy recommendations.     Do you take the medication Naltrexone?  No    Do you take blood thinners?  No       Prep   Are you currently on dialysis or do you have chronic kidney disease?  No    Do you have a diagnosis of diabetes?  No    Do you have a diagnosis of cystic fibrosis (CF)?  No    On a regular basis do you go 3 -5 days between bowel movements?  No    BMI > 40?  No    Preferred Pharmacy:    [id number] - [place], [place] - [address] AT [facility] [address]  [address]  [place] [address]  Phone: [phone] Fax: [phone]      Final Scheduling Details     Procedure scheduled  Upper endoscopy (EGD)    Surgeon:  Leventhal      Date of procedure:  03/20/2024     Pre-OP / PAC:   No - Not required for this site.    Location  [facility] - ASC - Per order.    Sedation   Moderate Sedation - Per order.      Patient Reminders:   You will receive a call from a Nurse to review instructions and health history.  This assessment must be completed prior to your procedure.  Failure to complete the Nurse assessment may result in the procedure being cancelled.      On the day of your procedure, please designate an adult(s) who can drive you home stay with you for the next 24 hours. The medicines used in the exam will make you sleepy. You will not be able to drive.      You cannot take public transportation, ride share services, or non-medical taxi service without a responsible caregiver.  Medical transport services are allowed with the requirement that a responsible caregiver will receive you at your destination.  We " 16 Fr. (Active)   Site Assessment Clean;Skin intact 11/17/23 1650   Collection Container Standard drainage bag 11/17/23 1650   Securement Method Securing device (Describe) 11/17/23 1650   Reason for Continuing Urinary Catheterization surgical procedures: urological/gynecological, pelvic oncology, anal, prolonged surgical procedure 11/17/23 1430   Output (mL) 350 mL 11/17/23 1800       Tourniquet Times:         Implants:  Implants       Type Name Action Serial No.      Implant GRAFT, XENOSURE, BIOLOGIC PATCH, 0.8CM X 8CM - [id number] Implanted               Findings: Mixed thrombus, firm tissue with acute secondary clot.    Indications: Mukesh Garg is an 73 y.o. male who is having surgery for Acute lower limb ischemia [I99.8]. Patient has a history of hypercoagulability and recurrent DVT who was recently switched from Coumadin to Eliquis. He presented to [facility] [place], operated by [facility] ED with LLE embolus and leg pain with monophasic AT signal and no motor or sensory deficit ([facility]'s 1 ALI). Of note, he was also noted to have RML and RLL PE. On transfer, PERT was activated with no intervention other than anticoagulation recommended. He underwent preoperative clearance for his leg revascularization.    The patient was seen in the preoperative area. The risks, benefits, complications, treatment options, non-operative alternatives, expected recovery and outcomes were discussed with the patient. The possibilities of reaction to medication, pulmonary aspiration, injury to surrounding structures, bleeding, recurrent infection, the need for additional procedures, failure to diagnose a condition, and creating a complication requiring transfusion or operation were discussed with the patient. The patient concurred with the proposed plan, giving informed consent.  The site of surgery was properly noted/marked if necessary per policy. The patient has been actively warmed in preoperative area. Preoperative antibiotics have been ordered and given  require that drivers and caregivers are confirmed prior to your procedure.   within 1 hours of incision. Venous thrombosis prophylaxis have been ordered including chemical prophylaxis    Procedure Details:   #15 blade used to create a transverse oblique incision above the inguinal crease. Electrocautery used to dissect through subcutaneous tissue until the inguinal ligament visualized. Dissection proceeded until the common femoral, superficial femoral, and profunda arteries were identified and exposed.    #15 blade used to create a longitudinal medial lower leg incision just below the tibia. Electrocautery and blunt dissection used to dissect through the soft tissue in avascular plane until the popliteal vein was visualized. This was looped to move posteriorly to assist with exposure. The popliteal artery was seen just posterior to this and was exposed for a length of 3 cm. Of note, he had large popliteal vein with large crossing tibial vein as well as venous hypertension of the small branches of veins that were ligated as part of the exposure. Due to this, I did not want to divide more vein than necessary.    Patient was systemically heparinized and serial ACT > 250 checked. He required AT3 and additional doses of heparin to become therapeutic. Longitudinal arteriotomy created with a #11 blade and extended with carvalho scissors. Thrombus was seen in the common femoral artery and overhanging into the SFA and profunda origins; pale and tissue like. This was carefully removed with forceps and sent to pathology. There was robust backbleed from the profunda afterwards. A bovine pericardial patch angioplasty was performed using 6-0 prolene. Flushing maneuvers were performed prior to completion of anastomosis.     #11 blade used to create arteriotomy in the popliteal artery. Dark soft clot visualized. #4 damon embolectomy catheter used to retrieve large amount of clot, however inflow was not as pulsatile as expected. A 4F sheath was placed in retrograde fashion along with a Command 18 wire into  the SFA. Sheath shot demonstrated large clot still in the P2 segment. Edgar #3 over the wire embolectomy performed with retrieval of a large mixed, firm clot and significant improvement in inflow afterwards.     Edgar #2 thrombectomy catheter used to perform tibial thrombectomy with return of moderate amount of dark soft clot, however there was no over the wire catheter available and the catheter preferentially went down the anterior tibial artery. Angiography demonstrated this was patent. A lateral leg cutdown was performed to assess if the anterior tibial artery was of enough caliber to cutdown and directly thrombectomy the proximal portion, however there was bleeding and crossing vessels which made this approach prohibitive. The tibioperoneal trunk was then isolated below the crossing tibial vein and arteriotomy performed with Edgar #2 catheter used and retrieval of large amount of clot. The inflow was pulsatile afterwards. Angiography showed the peroneal artery was now open and supplied collateral to the DP.    Wounds were copiously irrigated with irrisept. No protamine was given. Wounds closed in multiple layers with 2-0 and 3-0 Vicryl. The groin incision was closed with 4-0 monocryl and dermabond. Rest of leg closed with staples.     Complications:  None; patient tolerated the procedure well.    Disposition: PACU - hemodynamically stable.  Condition: stable         Additional Details: multiphasic signals    Attending Attestation: I was present and scrubbed for the entire procedure.    Monika Pruett  Phone Number: [phone]

## 2024-03-15 NOTE — TELEPHONE ENCOUNTER
Brunilda unfortunately doesn't meet criteria for ozempic, as she doesn't have diabetes. Will not pursue PA.    Sincerely,  Dr. Jacinta Vera MD  3/15/2024      Diabetes Data    BP Readings from Last 2 Encounters:   01/19/24 109/75   01/04/24 106/66     Hemoglobin A1C (%)   Date Value   01/19/2024 4.8     LDL Cholesterol Calculated (mg/dL)   Date Value   01/19/2024 152 (H)   01/12/2023 193 (H)

## 2024-03-15 NOTE — TELEPHONE ENCOUNTER
Central Prior Authorization Team - Phone: [phone]     PRIOR AUTHORIZATION DENIED    Medication: OZEMPIC (0.25 OR 0.5 MG/DOSE) 2 MG/3ML SC SOPN  Insurance Company: [employer] ([facility]) - Phone [phone] Fax [phone]  Denial Date: 3/12/2024    Denial Reason(s): only covered for Type 2 Diabetes Mellitus diagnosis.        Appeal Information: If the provider would like to appeal, please provide a letter of medical necessity and route back to the team. Otherwise you can close the encounter. Thank you, Central PA Team    Patient Notified: NO  Unfortunately, we cannot call the patient with denials because we do not know what next steps the MD will take nor can we give medical advice, please notify the patient of what they are to expect for the continuation of their therapy from the provider.

## 2024-03-15 NOTE — TELEPHONE ENCOUNTER
Rescheduled procedure    Patient scheduled for Upper endoscopy (EGD) on 3/29/2024.    Arrival time: 0845. Procedure time 0945    Pre op exam needed? N/A    Facility location: [facility]; [address] [place] [address], [address] Floor, [place] [address]    Sedation type: Conscious sedation     Pre-Assessment was completed for previously scheduled procedure. (See documentation below).  No new medical events or medications since last review.     Resent prep instructions via [employer].    Left a message for Patient  to return call to pre-assessment team at [phone] option 4 if they have any questions regarding rescheduled procedure and/or how to prepare.    Tania Escalona RN  Endoscopy Procedure Pre Assessment RN

## 2024-03-15 NOTE — TELEPHONE ENCOUNTER
Pre assessment completed for upcoming procedure.   (Please see previous telephone encounter notes for complete details)    Patient  returned call.       Procedure details:    Arrival time and facility location reviewed.    Pre op exam needed? N/A    Designated  policy reviewed. Instructed to have someone stay 6  hours post procedure.       Medication review:    Medications reviewed. Please see supporting documentation below. Holding recommendations discussed (if applicable).       Prep for procedure:     Procedure prep instructions reviewed.        Any additional information needed:  N/A      Patient  verbalized understanding and had no questions or concerns at this time.      Nia Stevenson RN  Endoscopy Procedure Pre Assessment RN  [phone] option 4

## 2024-03-25 NOTE — TELEPHONE ENCOUNTER
I will not pursue PA.  Sincerely,  Dr. Jacinta Vera MD  3/26/2024      PA for this medication was submitted and denied in encounter dated 02/28/2024. If you would like to appeal please provide a letter of medical necessity with clinical reason and route the original encounter back to the PA team. Thank you.

## 2024-03-29 ENCOUNTER — HOSPITAL ENCOUNTER (OUTPATIENT)
Facility: CLINIC | Age: 54
Discharge: HOME | End: 2024-03-29
Attending: INTERNAL MEDICINE | Admitting: INTERNAL MEDICINE
Payer: COMMERCIAL

## 2024-03-29 VITALS
RESPIRATION RATE: 16 BRPM | OXYGEN SATURATION: 95 % | HEART RATE: 77 BPM | SYSTOLIC BLOOD PRESSURE: 101 MMHG | DIASTOLIC BLOOD PRESSURE: 78 MMHG

## 2024-03-29 LAB — UPPER GI ENDOSCOPY: NORMAL

## 2024-03-29 PROCEDURE — 88305 TISSUE EXAM BY PATHOLOGIST: CPT | Mod: 26 | Performed by: PATHOLOGY

## 2024-03-29 PROCEDURE — 43249 ESOPH EGD DILATION <30 MM: CPT | Performed by: INTERNAL MEDICINE

## 2024-03-29 PROCEDURE — 250N000011 HC RX IP 250 OP 636: Performed by: INTERNAL MEDICINE

## 2024-03-29 PROCEDURE — G0500 MOD SEDAT ENDO SERVICE >5YRS: HCPCS | Performed by: INTERNAL MEDICINE

## 2024-03-29 PROCEDURE — 43239 EGD BIOPSY SINGLE/MULTIPLE: CPT | Performed by: INTERNAL MEDICINE

## 2024-03-29 PROCEDURE — 99153 MOD SED SAME PHYS/QHP EA: CPT | Performed by: INTERNAL MEDICINE

## 2024-03-29 PROCEDURE — 88305 TISSUE EXAM BY PATHOLOGIST: CPT | Mod: TC | Performed by: INTERNAL MEDICINE

## 2024-03-29 RX ORDER — NALOXONE HYDROCHLORIDE 0.4 MG/ML
0.2 INJECTION, SOLUTION INTRAMUSCULAR; INTRAVENOUS; SUBCUTANEOUS
Status: DISCONTINUED | OUTPATIENT
Start: 2024-03-29 | End: 2024-03-29 | Stop reason: HOSPADM

## 2024-03-29 RX ORDER — ONDANSETRON 2 MG/ML
4 INJECTION INTRAMUSCULAR; INTRAVENOUS
Status: DISCONTINUED | OUTPATIENT
Start: 2024-03-29 | End: 2024-03-29 | Stop reason: HOSPADM

## 2024-03-29 RX ORDER — FENTANYL CITRATE 50 UG/ML
INJECTION, SOLUTION INTRAMUSCULAR; INTRAVENOUS PRN
Status: DISCONTINUED | OUTPATIENT
Start: 2024-03-29 | End: 2024-03-29 | Stop reason: HOSPADM

## 2024-03-29 RX ORDER — PROCHLORPERAZINE MALEATE 5 MG
10 TABLET ORAL EVERY 6 HOURS PRN
Status: DISCONTINUED | OUTPATIENT
Start: 2024-03-29 | End: 2024-03-29 | Stop reason: HOSPADM

## 2024-03-29 RX ORDER — ONDANSETRON 4 MG/1
4 TABLET, ORALLY DISINTEGRATING ORAL EVERY 6 HOURS PRN
Status: DISCONTINUED | OUTPATIENT
Start: 2024-03-29 | End: 2024-03-29 | Stop reason: HOSPADM

## 2024-03-29 RX ORDER — FLUMAZENIL 0.1 MG/ML
0.2 INJECTION, SOLUTION INTRAVENOUS
Status: DISCONTINUED | OUTPATIENT
Start: 2024-03-29 | End: 2024-03-29 | Stop reason: HOSPADM

## 2024-03-29 RX ORDER — NALOXONE HYDROCHLORIDE 0.4 MG/ML
0.4 INJECTION, SOLUTION INTRAMUSCULAR; INTRAVENOUS; SUBCUTANEOUS
Status: DISCONTINUED | OUTPATIENT
Start: 2024-03-29 | End: 2024-03-29 | Stop reason: HOSPADM

## 2024-03-29 RX ORDER — ONDANSETRON 2 MG/ML
4 INJECTION INTRAMUSCULAR; INTRAVENOUS EVERY 6 HOURS PRN
Status: DISCONTINUED | OUTPATIENT
Start: 2024-03-29 | End: 2024-03-29 | Stop reason: HOSPADM

## 2024-03-29 RX ORDER — LIDOCAINE 40 MG/G
CREAM TOPICAL
Status: DISCONTINUED | OUTPATIENT
Start: 2024-03-29 | End: 2024-03-29 | Stop reason: HOSPADM

## 2024-03-29 ASSESSMENT — ACTIVITIES OF DAILY LIVING (ADL)
ADLS_ACUITY_SCORE: 35
ADLS_ACUITY_SCORE: 35

## 2024-03-29 NOTE — OR NURSING
Pt underwent EGD with esophageal balloon dilation and biopsies under conscious sedation. Specimens: sent to lab. Pt transferred to recovery and report given to endo RN.       Gloria Easton RN

## 2024-03-29 NOTE — H&P
ENDOSCOPY PRE-SEDATION H&P FOR OUTPATIENT PROCEDURES    Ameena Cagle  [id number]  1970    Procedure: EGD    Pre-procedure diagnosis: liquid dysphagia. Abnormal video swallow.    Past medical history:   Past Medical History:   Diagnosis Date    History of colonic polyps        Past surgical history:   Past Surgical History:   Procedure Laterality Date    APPENDECTOMY  1991    laparoscopic    BUNIONECTOMY LOU BILATERAL Bilateral 2012    and      SECTION  1995    HYSTERECTOMY  2001    menometrorhagia, no malignancy, ovaries, cervix remain       Current Facility-Administered Medications   Medication    flumazenil (ROMAZICON) injection 0.2 mg    naloxone (NARCAN) injection 0.2 mg    naloxone (NARCAN) injection 0.2 mg    naloxone (NARCAN) injection 0.4 mg    naloxone (NARCAN) injection 0.4 mg    ondansetron (ZOFRAN ODT) ODT tab 4 mg    Or    ondansetron (ZOFRAN) injection 4 mg    prochlorperazine (COMPAZINE) injection 10 mg    Or    prochlorperazine (COMPAZINE) tablet 10 mg       Allergies   Allergen Reactions    Penicillins Rash       History of Anesthesia/Sedation Problems: no    PHYSICAL EXAMINATION:  Constitutional: aaox3, cooperative, pleasant  Vitals reviewed: /80   Pulse 72   Resp 16   SpO2 99%   Wt:   Wt Readings from Last 2 Encounters:   24 71.5 kg (157 lb 11.2 oz)   24 72.6 kg (160 lb)      Eyes: Sclera anicteric/injected  Ears/nose/mouth/throat: Normal oropharynx without ulcers or exudate, mucus membranes moist, hearing intact  Neck: supple, thyroid normal size  CV: No edema  Respiratory: Unlabored breathing  Lymph: No submandibular, supraclavicular or inguinal lymphadenopathy  Abd: Nondistended, no masses, nontender  Skin: warm, perfused, no jaundice  Psych: Normal affect  MSK: normal movement on limited exam.    ASA Score: See Provation note    Assessment/Plan:     The patient is an appropriate candidate to receive  sedation.    Informed consent was discussed with the patient/family, including the risks, benefits, potential complications and any alternative options associated with sedation.    Patient assessment completed just prior to sedation and while under constant observation by the provider. Condition determined to be adequate for proceeding with sedation.    The specific risks for the procedure were discussed with the patient at the time of informed consent and include but are not limited to perforation which could require surgery, missing significant neoplasm or lesion, hemorrhage and adverse sedative complication.      Bharathi Marx MD

## 2024-04-01 LAB
PATH REPORT.COMMENTS IMP SPEC: NORMAL
PATH REPORT.COMMENTS IMP SPEC: NORMAL
PATH REPORT.FINAL DX SPEC: NORMAL
PATH REPORT.GROSS SPEC: NORMAL
PATH REPORT.MICROSCOPIC SPEC OTHER STN: NORMAL
PATH REPORT.RELEVANT HX SPEC: NORMAL
PHOTO IMAGE: NORMAL

## 2024-04-16 ENCOUNTER — PRE VISIT (OUTPATIENT)
Dept: GASTROENTEROLOGY | Facility: CLINIC | Age: 54
End: 2024-04-16

## 2024-04-16 ENCOUNTER — OFFICE VISIT (OUTPATIENT)
Dept: GASTROENTEROLOGY | Facility: CLINIC | Age: 54
End: 2024-04-16
Payer: COMMERCIAL

## 2024-04-16 VITALS
HEART RATE: 82 BPM | DIASTOLIC BLOOD PRESSURE: 73 MMHG | WEIGHT: 155.1 LBS | SYSTOLIC BLOOD PRESSURE: 108 MMHG | BODY MASS INDEX: 33.23 KG/M2 | OXYGEN SATURATION: 96 %

## 2024-04-16 DIAGNOSIS — R13.10 DYSPHAGIA, UNSPECIFIED TYPE: Primary | ICD-10-CM

## 2024-04-16 DIAGNOSIS — R93.3 ABNORMAL BARIUM SWALLOW: ICD-10-CM

## 2024-04-16 PROCEDURE — 99204 OFFICE O/P NEW MOD 45 MIN: CPT | Performed by: PHYSICIAN ASSISTANT

## 2024-04-16 ASSESSMENT — PAIN SCALES - GENERAL: PAINLEVEL: NO PAIN (0)

## 2024-04-16 NOTE — PROGRESS NOTES
GI CLINIC VISIT    CC/REFERRING MD:  Jacinta Vera  REASON FOR CONSULTATION: dysphagia    ASSESSMENT/PLAN:    54 y/o F presents for evaluation of dysphagia.     #dysphagia  Patient reports that she has had dysphagia particularly to liquids and pills for the last few years generally occur a couple times a week.  She did undergo a barium swallow that was concerning for tablet being retained in the mid esophagus as well as transient delay in the GE junction.  She subsequently underwent an upper endoscopy and underwent empiric dilation.  EGD was otherwise unrevealing.  She has had no change in her symptoms despite dilation.  We discussed that next steps would be to have her undergo high-resolution manometry testing to evaluate for esophageal dysmotility.  She does report on occasion will have reflux symptoms generally 2-3 times a month, will take Pepcid and symptoms will resolve.  --schedule HRM     Colorectal cancer screening: due in fall 2024, last done in 2019, done through [facility].     RTC 3 months or after HRM testing has been performed.    Thank you for this consultation.  It was a pleasure to participate in the care of this patient; please contact us with any further questions.       30 minutes spent on the date of the encounter doing chart review, review of test results, patient visit, and documentation    This note was created with voice recognition software, and while reviewed for accuracy, typos may remain.    Lane Magana PA-C  Division of Gastroenterology, Hepatology and Nutrition  [facility]  54 y/o F presents for evaluation of dysphagia.     Patient notes that she has had issues with dysphagia for the last few years - can occur with both solids and liquids. She notices when she takes pills, can feel like it gets stuck, will either drink more water or cough and pill will go down or will come back up. She will generally have dysphagia to liquids a  "couple of times a week, feels that it \"gets stuck up towards the top\". She will generally cough and choke and phglem will come up. She does have issues with heartburn usually 2-3x/ month, will take tums as needed which tends to resolves the symptoms. She has had more trouble with heartburn due to increasing weight. Denies abdominal pain, nausea, or vomiting.     Does note she has issues with constipation at times, otherwise has regular bowel movements.     Patient underwent a video swallow study on 3/6/24, which showed No episodes of penetration or aspiration with any of the barium coated compounds. Because of her symptoms, a 13 mm barium tablet was given an esophageal sweep was performed. Retained barium and barium coated cracker noted in the mid esophagus. There is transient delay at the GE junction of the tablet which passes in less than a minute. She subsequently underwent an EGD on 3/29/24, a few gastric polyps were found, and she underwent empiric dilation given above findings to 21mm including GEJ and UES. Patient notes she has not had any improvement       Uses  NSAIDs a couple of times a week. Denies Tylenol. Started ozempic in may 2023 - was off for a week before the EGD.     Occasional alcohol use. Denies tobacco products. No recreational drug use.     No family history of GI related malignancy (esophageal, gastric, pancreatic, liver or colon) or family history of IBD/celiac disease.     ROS:    No fevers or chills  No weight loss  No shortness of breath or wheezing  No chest pain or pressure  +dysphagia  No BRBPR, hematochezia, melena  +depression and anxiety -- currently escitalopram, prescribed by PCP. Has previously seen a therapist.    PROBLEM LIST  Patient Active Problem List    Diagnosis Date Noted    Difficulty swallowing liquids 01/19/2024     Priority: Medium    Onychomycosis 01/19/2024     Priority: Medium    Generalized anxiety disorder 01/19/2024     Priority: Medium    Mild persistent asthma " without complication 2024     Priority: Medium    CARDIOVASCULAR SCREENING; LDL GOAL LESS THAN 160 2024     Priority: Medium    Moderate recurrent major depression (H) 2024     Priority: Medium    Dyspareunia in female 2023     Priority: Medium    Primary osteoarthritis of both knees 2023     Priority: Medium    History of gestational diabetes 2023     Priority: Medium    Obesity, Class II, BMI 35-39.9 2023     Priority: Medium       PERTINENT PAST MEDICAL HISTORY:    Past Medical History:   Diagnosis Date    History of colonic polyps        PREVIOUS SURGERIES:    Past Surgical History:   Procedure Laterality Date    APPENDECTOMY  1991    laparoscopic    BUNIONECTOMY LOU BILATERAL Bilateral 2012    and      SECTION  1995    ESOPHAGOSCOPY, GASTROSCOPY, DUODENOSCOPY (EGD), COMBINED N/A 3/29/2024    Procedure: ESOPHAGOGASTRODUODENOSCOPY, WITH BIOPSY;  Surgeon: Bharathi Marx MD;  Location:  GI    HYSTERECTOMY  2001    menometrorhagia, no malignancy, ovaries, cervix remain       PREVIOUS ENDOSCOPY:  EGD (3/29/24):  - Normal esophagus. Given prior findings on                          esophagram, entire esophagus was dilated to 21mm,                          including the GEJ, and the UES. Biopsied to evaluate                          for EoE.                          - Gastroesophageal flap valve classified as Hill Grade                          II (fold present, opens with respiration).                          - A few diminutive gastric polyps, endoscopic                          diagnosis of benign fundic gland polyps.                          - Normal examined duodenum.   Final Diagnosis   A. DISTAL ESOPHAGUS, BIOPSY:  Squamous esophageal mucosa with no dysplasia, malignancy, intraepithelial eosinophils or other abnormality      B. PROXIMAL ESOPHAGUS, BIOPSY:  Squamous esophageal mucosa with no dysplasia,  malignancy, intraepithelial eosinophils or other abnormality         ALLERGIES:     Allergies   Allergen Reactions    Penicillins Rash       PERTINENT MEDICATIONS:    Current Outpatient Medications:     albuterol (PROAIR HFA/PROVENTIL HFA/VENTOLIN HFA) 108 (90 Base) MCG/ACT inhaler, , Disp: , Rfl:     amitriptyline (ELAVIL) 25 MG tablet, Take 1 tablet (25 mg) by mouth At Bedtime, Disp: 90 tablet, Rfl: 4    escitalopram (LEXAPRO) 10 MG tablet, Take 1 tablet (10 mg) by mouth daily, Disp: 90 tablet, Rfl: 3    estradiol (ESTRACE) 0.1 MG/GM vaginal cream, Place 2 g vaginally daily For 2 weeks then 2-3 times weekly (Patient taking differently: Place vaginally daily For 2 weeks then 2-3 times weekly), Disp: 85 g, Rfl: 11    fluticasone (ARNUITY ELLIPTA) 100 MCG/ACT inhaler, Inhale 1 puff into the lungs daily, Disp: 90 each, Rfl: 3    semaglutide (OZEMPIC, 0.25 OR 0.5 MG/DOSE,) 2 MG/3ML pen, Inject 0.5 mg Subcutaneous every 7 days, Disp: 3 mL, Rfl: 3    terbinafine (LAMISIL) 250 MG tablet, Take 1 tablet (250 mg) by mouth daily for 180 days, Disp: 180 tablet, Rfl: 0    valACYclovir (VALTREX) 500 MG tablet, Take 1 tablet (500 mg) by mouth 2 times daily for 3 days, Disp: 18 tablet, Rfl: 4    SOCIAL HISTORY:    Social History     Socioeconomic History    Marital status:      Spouse name: Not on file    Number of children: Not on file    Years of education: Not on file    Highest education level: Not on file   Occupational History    Not on file   Tobacco Use    Smoking status: Never    Smokeless tobacco: Never   Vaping Use    Vaping status: Never Used   Substance and Sexual Activity    Alcohol use: Not Currently     Alcohol/week: 1.0 standard drink of alcohol     Types: 1 Standard drinks or equivalent per week    Drug use: Never    Sexual activity: Not on file   Other Topics Concern    Not on file   Social History Narrative    Not on file     Social Determinants of Health     Financial Resource Strain: Low Risk   (1/19/2024)    Financial Resource Strain     Within the past 12 months, have you or your family members you live with been unable to get utilities (heat, electricity) when it was really needed?: No   Food Insecurity: Low Risk  (1/19/2024)    Food Insecurity     Within the past 12 months, did you worry that your food would run out before you got money to buy more?: No     Within the past 12 months, did the food you bought just not last and you didn t have money to get more?: No   Transportation Needs: Low Risk  (1/19/2024)    Transportation Needs     Within the past 12 months, has lack of transportation kept you from medical appointments, getting your medicines, non-medical meetings or appointments, work, or from getting things that you need?: No   Physical Activity: Not on file   Stress: Not on file   Social Connections: Not on file   Interpersonal Safety: Low Risk  (1/19/2024)    Interpersonal Safety     Do you feel physically and emotionally safe where you currently live?: Yes     Within the past 12 months, have you been hit, slapped, kicked or otherwise physically hurt by someone?: No     Within the past 12 months, have you been humiliated or emotionally abused in other ways by your partner or ex-partner?: No   Housing Stability: Low Risk  (1/19/2024)    Housing Stability     Do you have housing? : Yes     Are you worried about losing your housing?: No       FAMILY HISTORY:  FH of CRC: None  FH of IBD: None  Family History   Problem Relation Age of Onset    Depression Mother     Prostate Cancer Father     Cerebrovascular Disease Father 85        d of massive stroke    Dementia Father     Colon Polyps Sister     Colon Polyps Brother        Past/family/social history reviewed and no changes    PHYSICAL EXAMINATION:  Constitutional: aaox3, cooperative, pleasant, not dyspneic/diaphoretic, no acute distress  Vitals reviewed: /73 (BP Location: Left arm, Patient Position: Sitting, Cuff Size: Adult Large)   Pulse  82   Wt 70.4 kg (155 lb 1.6 oz)   SpO2 96%   BMI 33.23 kg/m    Wt:   Wt Readings from Last 2 Encounters:   01/19/24 71.5 kg (157 lb 11.2 oz)   01/04/24 72.6 kg (160 lb)      Eyes: Sclera anicteric/injected  Respiratory: Unlabored breathing  Skin: warm, perfused, no jaundice  Psych: Normal affect  MSK: Normal gait

## 2024-04-16 NOTE — LETTER
4/16/2024         RE: Ameena Cagle  [address] [place] [address]        Dear Colleague,    Thank you for referring your patient, Ameena Cagle, to the [facility]. Please see a copy of my visit note below.    GI CLINIC VISIT    CC/REFERRING MD:  Jacinta Vera  REASON FOR CONSULTATION: dysphagia    ASSESSMENT/PLAN:    52 y/o F presents for evaluation of dysphagia.     #dysphagia  Patient reports that she has had dysphagia particularly to liquids and pills for the last few years generally occur a couple times a week.  She did undergo a barium swallow that was concerning for tablet being retained in the mid esophagus as well as transient delay in the GE junction.  She subsequently underwent an upper endoscopy and underwent empiric dilation.  EGD was otherwise unrevealing.  She has had no change in her symptoms despite dilation.  We discussed that next steps would be to have her undergo high-resolution manometry testing to evaluate for esophageal dysmotility.  She does report on occasion will have reflux symptoms generally 2-3 times a month, will take Pepcid and symptoms will resolve.  --schedule HRM     Colorectal cancer screening: due in fall 2024, last done in 2019, done through [facility].     RTC 3 months or after HRM testing has been performed.    Thank you for this consultation.  It was a pleasure to participate in the care of this patient; please contact us with any further questions.       30 minutes spent on the date of the encounter doing chart review, review of test results, patient visit, and documentation    This note was created with voice recognition software, and while reviewed for accuracy, typos may remain.    Lane Magana PA-C  Division of Gastroenterology, Hepatology and Nutrition  [facility]  52 y/o F presents for evaluation of dysphagia.     Patient notes that she has had issues with dysphagia  Pt notified that all testing was normal.   "for the last few years - can occur with both solids and liquids. She notices when she takes pills, can feel like it gets stuck, will either drink more water or cough and pill will go down or will come back up. She will generally have dysphagia to liquids a couple of times a week, feels that it \"gets stuck up towards the top\". She will generally cough and choke and phglem will come up. She does have issues with heartburn usually 2-3x/ month, will take tums as needed which tends to resolves the symptoms. She has had more trouble with heartburn due to increasing weight. Denies abdominal pain, nausea, or vomiting.     Does note she has issues with constipation at times, otherwise has regular bowel movements.     Patient underwent a video swallow study on 3/6/24, which showed No episodes of penetration or aspiration with any of the barium coated compounds. Because of her symptoms, a 13 mm barium tablet was given an esophageal sweep was performed. Retained barium and barium coated cracker noted in the mid esophagus. There is transient delay at the GE junction of the tablet which passes in less than a minute. She subsequently underwent an EGD on 3/29/24, a few gastric polyps were found, and she underwent empiric dilation given above findings to 21mm including GEJ and UES. Patient notes she has not had any improvement       Uses  NSAIDs a couple of times a week. Denies Tylenol. Started ozempic in may 2023 - was off for a week before the EGD.     Occasional alcohol use. Denies tobacco products. No recreational drug use.     No family history of GI related malignancy (esophageal, gastric, pancreatic, liver or colon) or family history of IBD/celiac disease.     ROS:    No fevers or chills  No weight loss  No shortness of breath or wheezing  No chest pain or pressure  +dysphagia  No BRBPR, hematochezia, melena  +depression and anxiety -- currently escitalopram, prescribed by PCP. Has previously seen a therapist.    PROBLEM " LIST  Patient Active Problem List    Diagnosis Date Noted     Difficulty swallowing liquids 2024     Priority: Medium     Onychomycosis 2024     Priority: Medium     Generalized anxiety disorder 2024     Priority: Medium     Mild persistent asthma without complication 2024     Priority: Medium     CARDIOVASCULAR SCREENING; LDL GOAL LESS THAN 160 2024     Priority: Medium     Moderate recurrent major depression (H) 2024     Priority: Medium     Dyspareunia in female 2023     Priority: Medium     Primary osteoarthritis of both knees 2023     Priority: Medium     History of gestational diabetes 2023     Priority: Medium     Obesity, Class II, BMI 35-39.9 2023     Priority: Medium       PERTINENT PAST MEDICAL HISTORY:    Past Medical History:   Diagnosis Date     History of colonic polyps        PREVIOUS SURGERIES:    Past Surgical History:   Procedure Laterality Date     APPENDECTOMY  1991    laparoscopic     BUNIONECTOMY LOU BILATERAL Bilateral 2012    and       SECTION  1995     ESOPHAGOSCOPY, GASTROSCOPY, DUODENOSCOPY (EGD), COMBINED N/A 3/29/2024    Procedure: ESOPHAGOGASTRODUODENOSCOPY, WITH BIOPSY;  Surgeon: Bharathi Marx MD;  Location:  GI     HYSTERECTOMY  2001    menometrorhagia, no malignancy, ovaries, cervix remain       PREVIOUS ENDOSCOPY:  EGD (3/29/24):  - Normal esophagus. Given prior findings on                          esophagram, entire esophagus was dilated to 21mm,                          including the GEJ, and the UES. Biopsied to evaluate                          for EoE.                          - Gastroesophageal flap valve classified as Hill Grade                          II (fold present, opens with respiration).                          - A few diminutive gastric polyps, endoscopic                          diagnosis of benign fundic gland polyps.                           - Normal examined duodenum.   Final Diagnosis   A. DISTAL ESOPHAGUS, BIOPSY:  Squamous esophageal mucosa with no dysplasia, malignancy, intraepithelial eosinophils or other abnormality      B. PROXIMAL ESOPHAGUS, BIOPSY:  Squamous esophageal mucosa with no dysplasia, malignancy, intraepithelial eosinophils or other abnormality         ALLERGIES:     Allergies   Allergen Reactions     Penicillins Rash       PERTINENT MEDICATIONS:    Current Outpatient Medications:      albuterol (PROAIR HFA/PROVENTIL HFA/VENTOLIN HFA) 108 (90 Base) MCG/ACT inhaler, , Disp: , Rfl:      amitriptyline (ELAVIL) 25 MG tablet, Take 1 tablet (25 mg) by mouth At Bedtime, Disp: 90 tablet, Rfl: 4     escitalopram (LEXAPRO) 10 MG tablet, Take 1 tablet (10 mg) by mouth daily, Disp: 90 tablet, Rfl: 3     estradiol (ESTRACE) 0.1 MG/GM vaginal cream, Place 2 g vaginally daily For 2 weeks then 2-3 times weekly (Patient taking differently: Place vaginally daily For 2 weeks then 2-3 times weekly), Disp: 85 g, Rfl: 11     fluticasone (ARNUITY ELLIPTA) 100 MCG/ACT inhaler, Inhale 1 puff into the lungs daily, Disp: 90 each, Rfl: 3     semaglutide (OZEMPIC, 0.25 OR 0.5 MG/DOSE,) 2 MG/3ML pen, Inject 0.5 mg Subcutaneous every 7 days, Disp: 3 mL, Rfl: 3     terbinafine (LAMISIL) 250 MG tablet, Take 1 tablet (250 mg) by mouth daily for 180 days, Disp: 180 tablet, Rfl: 0     valACYclovir (VALTREX) 500 MG tablet, Take 1 tablet (500 mg) by mouth 2 times daily for 3 days, Disp: 18 tablet, Rfl: 4    SOCIAL HISTORY:    Social History     Socioeconomic History     Marital status:      Spouse name: Not on file     Number of children: Not on file     Years of education: Not on file     Highest education level: Not on file   Occupational History     Not on file   Tobacco Use     Smoking status: Never     Smokeless tobacco: Never   Vaping Use     Vaping status: Never Used   Substance and Sexual Activity     Alcohol use: Not Currently     Alcohol/week: 1.0  standard drink of alcohol     Types: 1 Standard drinks or equivalent per week     Drug use: Never     Sexual activity: Not on file   Other Topics Concern     Not on file   Social History Narrative     Not on file     Social Determinants of Health     Financial Resource Strain: Low Risk  (1/19/2024)    Financial Resource Strain      Within the past 12 months, have you or your family members you live with been unable to get utilities (heat, electricity) when it was really needed?: No   Food Insecurity: Low Risk  (1/19/2024)    Food Insecurity      Within the past 12 months, did you worry that your food would run out before you got money to buy more?: No      Within the past 12 months, did the food you bought just not last and you didn t have money to get more?: No   Transportation Needs: Low Risk  (1/19/2024)    Transportation Needs      Within the past 12 months, has lack of transportation kept you from medical appointments, getting your medicines, non-medical meetings or appointments, work, or from getting things that you need?: No   Physical Activity: Not on file   Stress: Not on file   Social Connections: Not on file   Interpersonal Safety: Low Risk  (1/19/2024)    Interpersonal Safety      Do you feel physically and emotionally safe where you currently live?: Yes      Within the past 12 months, have you been hit, slapped, kicked or otherwise physically hurt by someone?: No      Within the past 12 months, have you been humiliated or emotionally abused in other ways by your partner or ex-partner?: No   Housing Stability: Low Risk  (1/19/2024)    Housing Stability      Do you have housing? : Yes      Are you worried about losing your housing?: No       FAMILY HISTORY:  FH of CRC: None  FH of IBD: None  Family History   Problem Relation Age of Onset     Depression Mother      Prostate Cancer Father      Cerebrovascular Disease Father 85        d of massive stroke     Dementia Father      Colon Polyps Sister       Colon Polyps Brother        Past/family/social history reviewed and no changes    PHYSICAL EXAMINATION:  Constitutional: aaox3, cooperative, pleasant, not dyspneic/diaphoretic, no acute distress  Vitals reviewed: /73 (BP Location: Left arm, Patient Position: Sitting, Cuff Size: Adult Large)   Pulse 82   Wt 70.4 kg (155 lb 1.6 oz)   SpO2 96%   BMI 33.23 kg/m    Wt:   Wt Readings from Last 2 Encounters:   01/19/24 71.5 kg (157 lb 11.2 oz)   01/04/24 72.6 kg (160 lb)      Eyes: Sclera anicteric/injected  Respiratory: Unlabored breathing  Skin: warm, perfused, no jaundice  Psych: Normal affect  MSK: Normal gait            Again, thank you for allowing me to participate in the care of your patient.        Sincerely,        Lane Magana PA-C

## 2024-04-16 NOTE — PATIENT INSTRUCTIONS
It was a pleasure taking care of you today.  I've included a brief summary of our discussion and care plan from today's visit below.  Please review this information with your primary care provider.  ______________________________________________________________________    My recommendations are summarized as follows:  --please schedule testing for high resolution manometry.     Lifestyle modifications for gastroesophageal reflux disease (GERD).     1. Change your eating habits.  -- It's best to eat several small meals instead of two or three large meals.  -- After you eat, wait 2 to 3 hours before you lie down. Late-night snacks aren't a good idea.  -- Chocolate, mint, and alcohol can make GERD worse. They relax the valve between the esophagus and the stomach.  -- Spicy foods, fatty foods, foods that have a lot of acid (like tomatoes and oranges), and coffee can make GERD symptoms worse in some people. If your symptoms are worse after you eat a certain food, you may want to stop eating that food to see if your symptoms get better.    2. Do not smoke or chew tobacco.    3. If you have GERD symptoms at night, raise the head of your bed 6 in. (15 cm) to 8 in. (20 cm) by putting the frame on blocks or placing a foam wedge under the head of your mattress. (Adding extra pillows does not work.)    4. Avoid or reduce pressure on your stomach. Don't wear tight clothing around your middle.    5. Lose weight if you need to. Losing just 5 to 10 pounds can help.        -- please see scheduling information provided below     Return to GI Clinic in 3 months to review your progress.    ______________________________________________________________________    How do I schedule labs, imaging studies, or procedures that were ordered in clinic today?     Labs: To schedule lab appointment at the [facility] [place], use my chart or call [phone]. If you have a [place] lab closer to home  where you are regularly seen you can give them a call.     Procedures: If a colonoscopy, upper endoscopy, breath test, esophageal manometry, or pH impedence was ordered today, our endoscopy team will call you to schedule this. If you have not heard from our endoscopy team within a week, please call [phone] to schedule.     Imaging Studies: If you were scheduled for a CT scan, X-ray, MRI, ultrasound, HIDA scan or other imaging study, please call [phone] to have this scheduled.     Referral: If a referral to another specialty was ordered, expect a phone call or follow instructions above. If you have not heard from anyone regarding your referral in a week, please call our clinic to check the status.     Who do I call with any questions after my visit?  Please be in touch if there are any further questions that arise following today's visit.  There are multiple ways to contact your gastroenterology care team.      During business hours, you may reach a Gastroenterology nurse at [phone]    To schedule or reschedule an appointment, please call [phone].     You can always send a secure message through Scicasts.  Scicasts messages are answered by your nurse or doctor typically within 24 hours.  Please allow extra time on weekends and holidays.      For urgent/emergent questions after business hours, you may reach the on-call GI Fellow by contacting the [facility]  at [phone].     How will I get the results of any tests ordered?    You will receive all of your results.  If you have signed up for Scicasts, any tests ordered at your visit will be available to you after your provider reviews them.  Typically this takes 1-2 weeks.  If there are urgent results that require a change in your care plan, your provider or nurse will call you to discuss the next steps.      What is Scicasts?  Scicasts is a secure way for you to access all of your healthcare records from the [facility]  [place].  It is a web based computer program, so you can sign on to it from any location.  It also allows you to send secure messages to your care team.  I recommend signing up for [employer] access if you have not already done so and are comfortable with using a computer.      How to I schedule a follow-up visit?  If you did not schedule a follow-up visit today, please call [phone] to schedule a follow-up office visit.      Sincerely,    Lane Magana PA-C  Division of Gastroenterology, Hepatology & Nutrition  [facility]

## 2024-04-29 ENCOUNTER — TELEPHONE (OUTPATIENT)
Dept: GASTROENTEROLOGY | Facility: CLINIC | Age: 54
End: 2024-04-29
Payer: COMMERCIAL

## 2024-04-29 NOTE — TELEPHONE ENCOUNTER
Non-Invasive GI Procedure Scheduling Questionnaire    Have you had a positive Covid test in the last 14 days?  No    Are you active on [employer]?   Yes    What insurance is in the chart?  Other:  [facility]    Ordering/Referring Provider: [place]   (If ordering provider performs procedure, schedule with ordering provider unless otherwise instructed. )    (Females) Are you currently pregnant? No - Okay to continue scheduling.    Have you ever had or are you awaiting a heart or lung transplant? No - Schedule next available.    Do you need assistance transferring? No - Continue scheduling.    Do you take acid reflux medication or proton-pump inhibitors (PPI)? No - Continue scheduling.    Patient Reminders:  Please inform patients to review instructions sent via Canines or letter two weeks before procedure.  The Manometry exam may take up to 90 minutes.  The Breath Test exam may take up to 4 hours.

## 2024-05-15 ENCOUNTER — ANCILLARY PROCEDURE (OUTPATIENT)
Dept: MAMMOGRAPHY | Facility: CLINIC | Age: 54
End: 2024-05-15
Attending: FAMILY MEDICINE
Payer: COMMERCIAL

## 2024-05-15 DIAGNOSIS — Z12.31 VISIT FOR SCREENING MAMMOGRAM: ICD-10-CM

## 2024-05-15 PROCEDURE — 77067 SCR MAMMO BI INCL CAD: CPT | Mod: TC | Performed by: RADIOLOGY

## 2024-05-15 PROCEDURE — 77063 BREAST TOMOSYNTHESIS BI: CPT | Mod: TC | Performed by: RADIOLOGY

## 2024-05-16 PROBLEM — N94.10 DYSPAREUNIA IN FEMALE: Status: RESOLVED | Noted: 2023-08-30 | Resolved: 2024-05-16

## 2024-05-16 NOTE — PROGRESS NOTES
"    DISCHARGE  Reason for Discharge: canceled remaining appointment stating, \"discontinuing therapy at this time.\"    Equipment Issued: .    Discharge Plan: Patient to continue home program and follow up with referring provider if having ongoing issues    Referring Provider:  Anahi Rucker       02/09/24 0500   Appointment Info   Signing clinician's name / credentials Anjali Melgar, PT, DPT   Total/Authorized Visits up to 12   Visits Used 9   Medical Diagnosis N94.10 (ICD-10-CM) - Dyspareunia, female,   PT Tx Diagnosis pelvic floor dysfunction, pelvic floor muscle tightness   Other pertinent information Discussed reason for referral regarding pelvic health needs and external/internal pelvic floor muscle examination with patient/guardian.  Opportunity provided to ask questions and verbal consent for assessment and intervention was given.   Quick Adds Pelvic Consent   Progress Note/Certification   Onset of illness/injury or Date of Surgery 07/07/23  (onset date ~ 3 years ago, order date 7/7/23)   Therapy Frequency 1x/week decreasing to every other week   Predicted Duration up to 12 weeks; 12 additional weeks   Progress Note Due Date 02/15/24   Progress Note Completed Date 01/11/24   GOALS   PT Goals 2;3;4;5   PT Goal 1   Goal Identifier HEP   Goal Description pt will demonstrate independence and report compliance with HEP to facilitate improved independent symptom mgmt.   Rationale to maximize safety and independence with performance of ADLs and functional tasks   Goal Progress in progress   Target Date 02/15/24   PT Goal 2   Goal Identifier incontinence management   Goal Description pt will be able to verbalize stress and urge incontinence strategies to reduce episodes of urinary leakage and improve QOL.   Rationale to maximize safety and independence with performance of ADLs and functional tasks;to maximize safety and independence with self cares   Goal Progress met   Target Date 11/22/23   Date Met 10/13/23 " "  PT Goal 3   Goal Identifier incontinence frequency   Goal Description pt will report 75% reduction in frequency of episodes of stress and urge incontinence to promote improved QOL.   Rationale to maximize safety and independence with performance of ADLs and functional tasks;to maximize safety and independence with self cares   Goal Progress met   Target Date 11/22/23   Date Met 10/13/23   PT Goal 4   Goal Identifier pelvic floor coordination   Goal Description pt will demonstrate improved pelvic floor coordination via ability to perform WNL PF lift, release and excursion to faciliate reduced episodes of urinary incontinence.   Rationale to maximize safety and independence with performance of ADLs and functional tasks;to maximize safety and independence with self cares   Goal Progress met   Target Date 02/15/24   PT Goal 5   Goal Identifier pelvic pain   Goal Description pt will report ability to tolerate deep penetration with intercourse without pain to facilitate improved QOL   Rationale to maximize safety and independence with self cares   Goal Progress in progress, size 5 dilator   Target Date 02/15/24   Subjective Report   Subjective Report Still on size 5 dilator. Hasn't been working with dilators or doing HEP much as she was sick for a few weeks.   Objective Measures   Objective Measures Objective Measure 1;Objective Measure 2   Objective Measure 1   Objective Measure pelvic floor coordination/mobility   Details full pelvic floor ascent and descent   Objective Measure 2   Objective Measure pelvic floor palpation   Details reduced resistance and tenderness at perineal body today   Treatment Interventions (PT)   Interventions Self Care/Home Management;Manual Therapy   Therapeutic Procedure/Exercise   Therapeutic Procedures: strength, endurance, ROM, flexibility minutes (72727) 5   Ther Proc 1 child's pose, extended   Ther Proc 1 - Details cues to tilt pelvis forward, \"tuck tailbone\"   PTRx Ther Proc 1 Happy " Baby   PTRx Ther Proc 1 - Details did not perform   PTRx Ther Proc 2 Pretzel Stretch   PTRx Ther Proc 2 - Details did not perform   PTRx Ther Proc 3 Piriformis Stretch Above 90 Degress Supine   PTRx Ther Proc 3 - Details did not perform   Skilled Intervention initiated HEP for hip stretching   Ther Proc 2 deep sumo squat stretch   Ther Proc 2 - Details focusing on breathing into pelvic floor and letting muscles drop/relax   Therapeutic Procedures Ther Proc 2;Ther Proc 3   Patient Response/Progress tolerated well   Neuromuscular Re-education   Neuromuscular re-ed of mvmt, balance, coord, kinesthetic sense, posture, proprioception minutes (81860) 9   Neuro Re-ed 1 pelvic floor coordination   Neuro Re-ed 1 - Details pt in hooklying: therapist gloved and lubricated finger palpating layer 3 PFM. Good demo, requiring cues for proper breath coordination x 1, improved descent with deeper inhale   Skilled Intervention pelvic floor coordination to improve ability to relax musculature   Patient Response/Progress good demo, min verbal cues   Manual Therapy   Manual Therapy: Mobilization, MFR, MLD, friction massage minutes (74118) [id number]   Manual Therapy Manual Therapy 2;Manual Therapy 3   Manual Therapy 1 myofascial release pelvic floor   Manual Therapy 1 - Details pt in hooklying: myofascial release throughout perineal body and surrounding tissues- mobilizing the tissue superiorly, inferiorly, laterally and posteriorly   Manual Therapy 2 trigger point release   Manual Therapy 2 - Details pt in hooklying: ischiocavernosus and bulbocavernosus giselle   Skilled Intervention manual therapy to reduce PFM tension to improve muscle function   Patient Response/Progress most pain at and near perineal body   Manual Therapy 3 myofascial release back   Manual Therapy 3 - Details throughout mid to low back and lateral rib cage   Education   Learner/Method Patient   Plan   Home program added new stretches, continue with dilator use   Plan for next  session manual work, how is dilator and myofascial work going independently?   Comments   Comments Pt would continue to benefit from skilled physical therapy.   Total Session Time   Timed Code Treatment Minutes 52   Total Treatment Time (sum of timed and untimed services) 52

## 2024-05-28 ENCOUNTER — VIRTUAL VISIT (OUTPATIENT)
Dept: FAMILY MEDICINE | Facility: CLINIC | Age: 54
End: 2024-05-28
Payer: COMMERCIAL

## 2024-05-28 ENCOUNTER — MYC MEDICAL ADVICE (OUTPATIENT)
Dept: FAMILY MEDICINE | Facility: CLINIC | Age: 54
End: 2024-05-28

## 2024-05-28 DIAGNOSIS — E66.812 OBESITY, CLASS II, BMI 35-39.9: Primary | ICD-10-CM

## 2024-05-28 PROCEDURE — 99213 OFFICE O/P EST LOW 20 MIN: CPT | Mod: 95 | Performed by: FAMILY MEDICINE

## 2024-05-28 NOTE — PROGRESS NOTES
Brunilda is a 53 year old who is being evaluated via a billable video visit.    How would you like to obtain your AVS? MyChart  If the video visit is dropped, the invitation should be resent by: Text to cell phone: [phone]  Will anyone else be joining your video visit? No      Assessment & Plan     ASSESSMENT / PLAN:  (E66.9) Obesity, Class II, BMI 35-39.9  (primary encounter diagnosis)  Comment: plateaued at current weight, no significant side effects on currnt dosage will increase dosage to 1 mg q week. She will check with our compounding pharmacy to see if she could obtain this medication at a better price, no insurance coverage for medication and she is paying out of pocket.  Plan: Semaglutide, 1 MG/DOSE, (OZEMPIC) 4 MG/3ML pen            Subjective   Brunilda is a 53 year old, presenting for the following health issues:  She's lost 28 lbs on Ozempic, but in the past few months hasn't lost weight and sugar cravings are back. She's trying to eat a health diet. She started Ozempic May 13th, has been on 0.5 mg dosage for most of the time. She's paying out of pocket.    Wt Readings from Last 4 Encounters:   04/16/24 70.4 kg (155 lb 1.6 oz)   01/19/24 71.5 kg (157 lb 11.2 oz)   01/04/24 72.6 kg (160 lb)   11/22/23 73 kg (161 lb)       weight management      5/28/2024     7:31 AM   Additional Questions   Roomed by Debbie MARQUEZ     History of Present Illness       Reason for visit:  Check in on my weight loss progress and discuss medication options. I have lost 27 pounds but have hit a plateau and not lost any weight for several months    She eats 2-3 servings of fruits and vegetables daily.She consumes 0 sweetened beverage(s) daily.She exercises with enough effort to increase her heart rate 60 or more minutes per day.  She exercises with enough effort to increase her heart rate 5 days per week.   She is taking medications regularly.         Review of Systems  Constitutional, HEENT, cardiovascular, pulmonary, gi and gu  Subjective:       Patient ID: Micheal Hunt is a 76 y.o. male.    Chief Complaint: Hypertension    He has CKD 4.  Chemistry           Component                Value               Date/Time                  NA                       138                 02/25/2018 1202            K                        3.8                 02/25/2018 1202            CL                       103                 02/25/2018 1202            CO2                      25                  02/25/2018 1202            BUN                      40 (H)              02/25/2018 1202            CREATININE               4.7 (H)             02/25/2018 1202            CREATININE               1.5 (H)             08/08/2013 0909            GLU                      132 (H)             02/25/2018 1202              Component                Value               Date/Time                  CALCIUM                  8.8                 02/25/2018 1202            CALCIUM                  9.0                 08/08/2013 0909            ALKPHOS                  86                  02/25/2018 1202            AST                      13                  02/25/2018 1202            ALT                      13                  02/25/2018 1202            BILITOT                  0.4                 02/25/2018 1202            ESTGFRAFRICA             13 (A)              02/25/2018 1202            EGFRNONAA                11 (A)              02/25/2018 1202       Under hemodialysis with chronic metabolic acidoses, .chronic anemia, and chronic obstructive pulmonary disease.      Hyperlipidemia   This is a recurrent problem. The problem is controlled. Exacerbating diseases include chronic renal disease. There are no known factors aggravating his hyperlipidemia. Associated symptoms include shortness of breath. Pertinent negatives include no chest pain.   Hypertension   This is a chronic problem. The current episode started more than 1 year ago. The problem is unchanged.  "systems are negative, except as otherwise noted.      Objective    Vitals - Patient Reported  Weight (Patient Reported): 68.9 kg (152 lb)  Height (Patient Reported): 147.3 cm (4' 10\")  BMI (Based on Pt Reported Ht/Wt): 31.77  Pain Score: No Pain (0)        Physical Exam   GENERAL: alert and no distress  EYES: Eyes grossly normal to inspection.  No discharge or erythema, or obvious scleral/conjunctival abnormalities.  RESP: No audible wheeze, cough, or visible cyanosis.    SKIN: Visible skin clear. No significant rash, abnormal pigmentation or lesions.  NEURO: Cranial nerves grossly intact.  Mentation and speech appropriate for age.  PSYCH: Appropriate affect, tone, and pace of words      Video-Visit Details    Type of service:  Video Visit   Originating Location (pt. Location): Home    Distant Location (provider location):  Off-site  Platform used for Video Visit: Lorena  Signed Electronically by: Jacinta Vera MD    " The problem is resistant. Associated symptoms include malaise/fatigue, peripheral edema and shortness of breath. Pertinent negatives include no chest pain, headaches, neck pain or palpitations. There are no associated agents to hypertension. Identifiable causes of hypertension include chronic renal disease.     Review of Systems   Constitutional: Positive for fatigue, malaise/fatigue and unexpected weight change.   Respiratory: Positive for shortness of breath. Negative for chest tightness.    Cardiovascular: Negative for chest pain, palpitations and leg swelling.   Gastrointestinal: Positive for abdominal distention and abdominal pain.        Left abdominal aches, no bleeding, non constipation.   Genitourinary: Positive for urgency.   Musculoskeletal: Positive for back pain and gait problem. Negative for arthralgias and neck pain.   Neurological: Positive for weakness. Negative for dizziness, syncope, light-headedness and headaches.   Psychiatric/Behavioral: Positive for confusion and decreased concentration.       Patient Active Problem List   Diagnosis    Osteoarthritis of right knee, L-TKR 10/2012    Nocturia    Hematuria    BPH (benign prostatic hyperplasia)    Carotid stenosis    Benign essential HTN    Hyperlipidemia, baseline     CAD (coronary artery disease), 2V CABG 2007    Gout, arthritis    Obesity, Class I, BMI 32.8 to 34.3, 32.7, today 32.6    Vertigo    LVH (left ventricular hypertrophy) due to hypertensive disease    Abnormal cardiovascular stress test    GERD (gastroesophageal reflux disease)    Elevated PSA    Sleep disorder    Acquired hammer toe    Diastolic dysfunction    Abdominal obesity    Back pain, chronic    Glucose intolerance (impaired glucose tolerance)    Anemia, mild    Gastric nodule    Diverticulitis, 2005, 2015    Personal history of colonic polyps    PSA elevation    DDD (degenerative disc disease), lumbar    LV dysfunction, EF 50%, reduced to  34%    Other spondylosis, lumbar region    Knee pain    BPH with obstruction/lower urinary tract symptoms    Itching, both legs, onset 7/2017    Chronic sinusitis    Mild persistent asthma without complication    Sigmoid diverticulitis    Anemia due to stage 4 chronic kidney disease    Thrombocytopenia    Renal cyst    Unilateral inguinal hernia       Objective:      Physical Exam   Constitutional: He is oriented to person, place, and time. He appears well-developed and well-nourished.   Cardiovascular: Normal rate, regular rhythm and normal heart sounds.    Pulmonary/Chest: Effort normal and breath sounds normal.   Musculoskeletal: He exhibits no edema.   Neurological: He is alert and oriented to person, place, and time.   Skin: Skin is warm and dry.   Psychiatric: He has a normal mood and affect. His speech is normal and behavior is normal. Thought content normal. Cognition and memory are impaired.   Nursing note and vitals reviewed.      Lab Results   Component Value Date    WBC 6.90 03/02/2018    HGB 10.6 (L) 03/02/2018    HCT 31.6 (L) 03/02/2018     03/02/2018    CHOL 116 (L) 03/10/2017    TRIG 121 03/10/2017    HDL 29 (L) 03/10/2017    ALT 12 03/02/2018    AST 20 03/02/2018     03/02/2018    K 4.3 03/02/2018     03/02/2018    CREATININE 4.6 (H) 03/02/2018    BUN 40 (H) 03/02/2018    CO2 23 03/02/2018    TSH 0.787 04/26/2017    PSA 4.01 (H) 06/03/2013    INR 0.97 05/09/2012    HGBA1C 6.2 02/13/2015     The ASCVD Risk score (Safford ANTONIO Jr., et al., 2013) failed to calculate for the following reasons:    The valid total cholesterol range is 130 to 320 mg/dL    Assessment:       1. Diverticulosis of large intestine without hemorrhage    2. Essential hypertension        Plan:       Diverticulosis of large intestine without hemorrhage  -     Ambulatory referral to Gastroenterology    Essential hypertension  -     Discontinue: valsartan (DIOVAN) 160 MG tablet; Take 2 tablets (320 mg total)  by mouth once daily.  Dispense: 180 tablet; Refill: 3  -     valsartan (DIOVAN) 320 MG tablet; Take 1 tablet (320 mg total) by mouth once daily.  Dispense: 90 tablet; Refill: 3      Patient readiness: nonacceptance and barriers:social stressors, household issues and poor sleep habits    During the course of the visit the patient was educated and counseled about the following:     Hypertension:   Dietary sodium restriction.  Check blood pressures daily and record.  low potassium diet.    Goals: Hypertension: Reduce Blood Pressure    Did patient meet goals/outcomes: No    The following self management tools provided: blood pressure log    Patient Instructions (the written plan) was given to the patient/family.     Time spent with patient: 45 minutes    Barriers to medications present (yes )    Adverse reactions to current medications (yes)    Over the counter medications reviewed (Yes)        40-minute visit. 20 minutes spent counseling patient on diet, exercise, and weight loss.  This has been fully explained to the patient, who indicates understanding.

## 2024-05-29 ENCOUNTER — TELEPHONE (OUTPATIENT)
Dept: FAMILY MEDICINE | Facility: CLINIC | Age: 54
End: 2024-05-29

## 2024-05-29 NOTE — TELEPHONE ENCOUNTER
Prior Authorization Retail Medication Request    Medication/Dose: Semaglutide, 1 MG/DOSE, (OZEMPIC) 4 MG/3ML pen   Diagnosis and ICD code (if different than what is on RX):  Obesity, Class II, BMI 35-39.9 [E66.9]  - Primary     Insurance   Primary: [facility]   Insurance ID:  [id number]     Pharmacy Information (if different than what is on RX)  Name:  [facility]  Phone:  [phone]  Fax:[phone]

## 2024-06-07 ENCOUNTER — PATIENT OUTREACH (OUTPATIENT)
Dept: GASTROENTEROLOGY | Facility: CLINIC | Age: 54
End: 2024-06-07
Payer: COMMERCIAL

## 2024-06-07 NOTE — TELEPHONE ENCOUNTER
-- PA was denied. Would you like to send a letter of appeal? Alternative prescription?    Susan Alegre RN  [facility]

## 2024-06-07 NOTE — TELEPHONE ENCOUNTER
Note: Due to record-high volumes, our turn-around time is taking longer than usual . We are currently 2 weeks behind in the pools.   We are working diligently to submit all requests in a timely manner and in the order they are received. Please only flag TRUE URGENT requests as high priority to the pool at this time.   If you have questions on status of PA's,  please send a note/message in the active PA encounter and send back to the [facility] PA pool [[id number]].    If you have questions about the turn-around time or about our process, please reach out to our supervisor Janell Collado.   Thank you!   RPPA (Retail Pharmacy Prior Authorization) team    We are currently submitting requests we received on 05/29/2024    PRIOR AUTHORIZATION DENIED    Medication: OZEMPIC (1 MG/DOSE) 4 MG/3ML SC SOPN  Insurance Company: [employer] ([facility]) - Phone [phone] Fax [phone]  Denial Date: 6/7/2024  Denial Rational:       Appeal Information: N/A      Patient Notified: No

## 2024-06-07 NOTE — TELEPHONE ENCOUNTER
Retail Pharmacy Prior Authorization Team   Phone: [phone]    PA Initiation    Medication: OZEMPIC (1 MG/DOSE) 4 MG/3ML SC SOPN  Insurance Company: [employer] ([facility]) - Phone [phone] Fax [phone]  Pharmacy Filling the Rx: [id number] - [place], [place] - [address]   Filling Pharmacy Phone: [phone]  Filling Pharmacy Fax:    Start Date: 6/7/2024

## 2024-06-10 ENCOUNTER — OFFICE VISIT (OUTPATIENT)
Dept: GASTROENTEROLOGY | Facility: CLINIC | Age: 54
End: 2024-06-10
Payer: COMMERCIAL

## 2024-06-10 VITALS
OXYGEN SATURATION: 96 % | BODY MASS INDEX: 31.49 KG/M2 | SYSTOLIC BLOOD PRESSURE: 112 MMHG | RESPIRATION RATE: 16 BRPM | HEART RATE: 75 BPM | TEMPERATURE: 97.4 F | DIASTOLIC BLOOD PRESSURE: 77 MMHG | HEIGHT: 58 IN | WEIGHT: 150 LBS

## 2024-06-10 DIAGNOSIS — R13.10 DYSPHAGIA, UNSPECIFIED TYPE: ICD-10-CM

## 2024-06-10 PROCEDURE — 91010 ESOPHAGUS MOTILITY STUDY: CPT | Performed by: PHYSICIAN ASSISTANT

## 2024-06-10 PROCEDURE — 91037 ESOPH IMPED FUNCTION TEST: CPT | Performed by: PHYSICIAN ASSISTANT

## 2024-06-10 ASSESSMENT — PAIN SCALES - GENERAL: PAINLEVEL: NO PAIN (0)

## 2024-06-10 NOTE — PATIENT INSTRUCTIONS
Esophogeal Manometry Study  1. Resume regular diet.  2. You may have a bloody nose or sore throat after the procedure.  3. If you have questions call [phone] from 7:00am-5:00pm.  For afterhours questions call GI doctor on call at [phone].

## 2024-06-10 NOTE — PROGRESS NOTES
Non-Invasive GI Procedure Visit    Ameena Cagle is a 53 year old female with history of Dysphagia, unspecified type.   Patient stated reason for procedure: Dysphagia      COVID-19 PCR Results          1/30/2024    10:01   COVID-19 PCR Results   SARS CoV2 PCR Negative      COVID-19 Antibody Results, Testing for Immunity           No data to display                Pre-Procedure Assessment  Patient presents to clinic today for Esophageal Manometry Study    Referring Provider: Lane LEO  Patient has undergone previous endoscopy.    Does patient report taking a PPI (omeprazole, pantoprazole, rabeprazole, lansoprazole, esomeprazole, dexlansoprazole)? No  Does patient report taking a H2 blocker (ranitidine, or famotidine)? No  Does patient report taking opioids? No  Patient reported that last food and/or drink was last consumed 10 hours ago.  Esophageal Questionnaire(s) Completed: Yes - Esophageal Questionnaire(s)    BEDQ Questionnaire      6/7/2024    10:19 PM   BEDQ Questionnaire: How Often Have You Had the Following?   Trouble eating solid food (meat, bread, vegetables) 0   Trouble eating soft foods (yogurt, jello, pudding) 0   Trouble swallowing liquids 0   Pain while swallowing 0   Coughing or choking while swallowing foods or liquids 2   Total Score: 2         6/7/2024    10:19 PM   BEDQ Questionnaire: Discomfort/Pain Ratings   Eating solid food (meat, bread, vegetables) 0   Eating soft foods (yogurt, jello, pudding) 0   Drinking liquid 0   Total Score: 0       Eckardt Questionnaire      6/7/2024    10:20 PM   Eckardt Questionnaire   Dysphagia 1   Regurgitation 1   Retrosternal Pain 1   Weight Loss (kg) 0   Total Score:  3       Promis 10 Questionnaire      6/7/2024    10:21 PM   PROMIS 10 FLOWSHEET DATA   In general, would you say your health is: 3   In general, would you say your quality of life is: 4   In general, how would you rate your physical health? 3   In general, how would you rate your mental  "health, including your mood and your ability to think? 3   In general, how would you rate your satisfaction with your social activities and relationships? 4   In general, please rate how well you carry out your usual social activities and roles. (This includes activities at home, at work and in your community, and responsibilities as a parent, child, spouse, employee, friend, etc.) 4   To what extent are you able to carry out your everyday physical activities such as walking, climbing stairs, carrying groceries, or moving a chair? 5   In the past 7 days, how often have you been bothered by emotional problems such as feeling anxious, depressed, or irritable? 2   In the past 7 days, how would you rate your fatigue on average? 3   In the past 7 days, how would you rate your pain on average, where 0 means no pain, and 10 means worst imaginable pain? 2   Mental health question re-calculation - no clinical value 4   Physical health question re-calculation - no clinical value 3   Pain question re-calculation - no clinical value 4   Global Mental Health Score 15   Global Physical Health Score 15   PROMIS TOTAL - SUBSCORES 30   .    Patient Hx  Patient's history, medications and allergies were reviewed.     Height: 4' 10\"   Weight: 150 lbs 0 oz    Patient Active Problem List    Diagnosis Date Noted    Difficulty swallowing liquids 01/19/2024     Priority: Medium    Onychomycosis 01/19/2024     Priority: Medium    Generalized anxiety disorder 01/19/2024     Priority: Medium    Mild persistent asthma without complication 01/19/2024     Priority: Medium    CARDIOVASCULAR SCREENING; LDL GOAL LESS THAN 160 01/19/2024     Priority: Medium    Moderate recurrent major depression (H) 01/19/2024     Priority: Medium    Primary osteoarthritis of both knees 08/09/2023     Priority: Medium    History of gestational diabetes 01/12/2023     Priority: Medium    Obesity, Class II, BMI 35-39.9 01/12/2023     Priority: Medium      Prior to " Admission medications    Medication Sig Start Date End Date Taking? Authorizing Provider   albuterol (PROAIR HFA/PROVENTIL HFA/VENTOLIN HFA) 108 (90 Base) MCG/ACT inhaler  3/7/23   Reported, Patient   amitriptyline (ELAVIL) 25 MG tablet Take 1 tablet (25 mg) by mouth At Bedtime 23   Anahi Rucker MD   escitalopram (LEXAPRO) 10 MG tablet Take 1 tablet (10 mg) by mouth daily 24   Jacinta Vera MD   estradiol (ESTRACE) 0.1 MG/GM vaginal cream Place 2 g vaginally daily For 2 weeks then 2-3 times weekly  Patient taking differently: Place vaginally daily For 2 weeks then 2-3 times weekly 23   Anahi Rucker MD   fluticasone (ARNUITY ELLIPTA) 100 MCG/ACT inhaler Inhale 1 puff into the lungs daily 24   Jacinta Vera MD   semaglutide (OZEMPIC, 0.25 OR 0.5 MG/DOSE,) 2 MG/3ML pen Inject 0.5 mg Subcutaneous every 7 days 24   Jacinta Vera MD   Semaglutide, 1 MG/DOSE, (OZEMPIC) 4 MG/3ML pen Inject 1 mg Subcutaneous every 7 days 24   Jacinta Vera MD   terbinafine (LAMISIL) 250 MG tablet Take 1 tablet (250 mg) by mouth daily for 180 days 24  Jacinta Vera MD     Allergies   Allergen Reactions    Penicillins Rash     Past Medical History:   Diagnosis Date    History of colonic polyps      Past Surgical History:   Procedure Laterality Date    APPENDECTOMY  1991    laparoscopic    BUNIONECTOMY LOU BILATERAL Bilateral 2012    and      SECTION  1995    ESOPHAGOSCOPY, GASTROSCOPY, DUODENOSCOPY (EGD), COMBINED N/A 3/29/2024    Procedure: ESOPHAGOGASTRODUODENOSCOPY, WITH BIOPSY;  Surgeon: Bharathi Marx MD;  Location: [facility]    HYSTERECTOMY  2001    menometrorhagia, no malignancy, ovaries, cervix remain     Family History   Problem Relation Age of Onset    Depression Mother     Prostate Cancer Father     Cerebrovascular Disease Father 85        d of massive stroke     Dementia Father     Colon Polyps Sister     Colon Polyps Brother      Social History     Tobacco Use    Smoking status: Never    Smokeless tobacco: Never   Substance Use Topics    Alcohol use: Not Currently     Alcohol/week: 1.0 standard drink of alcohol     Types: 1 Standard drinks or equivalent per week        Pre-Procedure Education & Consent  Procedure education was provided to: Patient  Teaching method: Explanation  Barriers to learning: No Barrier    Patient indicated understanding of pre-procedure instruction and appropriate consent was obtained and documented.    ____________________________________________________________________    Post-Procedure Documentation: Esophageal Manometry    Manometry catheter was placed via right nare to 44 cm and normal saline swallows given per protocol. Manometry catheter was removed at the end of test.    Discharge instructions given to patient.    Notification of pending test results sent to provider for interpretation. Please reference scanned document for final interpretation of results. Patient will follow up with referring provider for test results.    Vanessa Godfrey RN on 6/10/2024 at 9:42 AM

## 2024-07-10 DIAGNOSIS — R13.10 DYSPHAGIA, UNSPECIFIED TYPE: Primary | ICD-10-CM

## 2024-07-11 DIAGNOSIS — N94.10 DYSPAREUNIA, FEMALE: ICD-10-CM

## 2024-07-11 NOTE — TELEPHONE ENCOUNTER
Requested Prescriptions   Pending Prescriptions Disp Refills    estradiol (ESTRACE) 0.1 MG/GM vaginal cream [Pharmacy Med Name: Estradiol 0.1 MG/GM Vaginal Cream] 85 g 3     Sig: INSERT 2 GRAMS VAGINALLY DAILY  FOR 2 WEEKS THEN 2 TO THREE  TIMES A WEEK       Hormone Replacement Therapy Failed - 7/11/2024 12:38 AM        Failed - Recent (12 mo) or future (90 days) visit within the authorizing provider's specialty     The patient must have completed an in-person or virtual visit within the past 12 months or has a future visit scheduled within the next 90 days with the authorizing provider s specialty.  Urgent care and e-visits do not quality as an office visit for this protocol.          Failed - Medication indicated for associated diagnosis     The medication is prescribed for one or more of the following conditions:    Menopause   Vulva/Vaginal atrophy   Low Estrogen   Gender Dysphoria   Male to Female transgender          Passed - Blood pressure under 140/90 in past 12 months     BP Readings from Last 3 Encounters:   06/10/24 112/77   04/16/24 108/73   03/29/24 101/78       No data recorded            Passed - Medication is active on med list        Passed - Patient is 18 years of age or older        Passed - No active pregnancy on record        Passed - No positive pregnancy test on record in past 12 months       Hormone Replacement Therapy (vaginal) Failed - 7/11/2024 12:38 AM        Failed - Recent (12 mo) or future (90 days) visit within the authorizing provider's specialty     The patient must have completed an in-person or virtual visit within the past 12 months or has a future visit scheduled within the next 90 days with the authorizing provider s specialty.  Urgent care and e-visits do not quality as an office visit for this protocol.          Failed - Medication indicated for associated diagnosis     Medication is associated with one or more of the following diagnoses:     Menopause   Vulva/Vaginal  atrophy   UTI prophylaxis          Passed - Medication is active on med list        Passed - Patient is 18 years of age or older        Passed - No active pregnancy on record        Passed - No positive pregnancy test on record in past 12 months        Last Written Prescription Date:  7/7/23  Last Fill Quantity: 85g,  # refills: 11   Last office visit: 7/7/2023 ; last virtual visit: Visit date not found with prescribing provider:  Anahi Rucker   Future Office Visit:    none          amitriptyline (ELAVIL) 25 MG tablet [Pharmacy Med Name: Amitriptyline HCl 25 MG Oral Tablet] 90 tablet 3     Sig: TAKE 1 TABLET BY MOUTH AT  BEDTIME       Tricyclic Agents ( Annual appt and no PHQ9) Failed - 7/11/2024 12:38 AM        Failed - PHQ-9 is only required if the requested medication is prescribed for depression or bipolar depression        Failed - Recent (12 mo) or future (90 days) visit within authorizing provider's specialty     The patient must have completed an in-person or virtual visit within the past 12 months or has a future visit scheduled within the next 90 days with the authorizing provider s specialty.  Urgent care and e-visits do not quality as an office visit for this protocol.          Passed - Blood Pressure under 140/90 in past 12 mos     BP Readings from Last 3 Encounters:   06/10/24 112/77   04/16/24 108/73   03/29/24 101/78       No data recorded            Passed - Medication is active on med list        Passed - Medicaiton indicated for associated diagnosis     Medication is associated with one or more of the following diagnoses:    Alcoholism   Anxiety   Attention deficit hyperactivity disorder   Depression   Fibromyalgia   Headache    Insomnia    Neurogenic bladder   Neuropathy   Nocturnal enuresis   Pain   Panic Disorder   Smoking cessation   Tinnitus          Passed - Patient is age 18 or older        Passed - Patient is not pregnant        Passed - No positive pregnancy test on record in past  12 mos           Last Written Prescription Date:  7/7/23  Last Fill Quantity: 90,  # refills: 4   Last office visit: 7/7/2023 ; last virtual visit: Visit date not found with prescribing provider:  Dr. Rucker   Future Office Visit:    None    Routing to provider to advise.  Leesa Reddy RN on 7/11/2024 at 9:03 AM

## 2024-07-14 RX ORDER — ESTRADIOL 0.1 MG/G
CREAM VAGINAL
Qty: 85 G | Refills: 0 | Status: SHIPPED | OUTPATIENT
Start: 2024-07-14 | End: 2024-09-25

## 2024-07-26 ENCOUNTER — ANCILLARY PROCEDURE (OUTPATIENT)
Dept: GENERAL RADIOLOGY | Facility: CLINIC | Age: 54
End: 2024-07-26
Attending: PHYSICIAN ASSISTANT
Payer: COMMERCIAL

## 2024-07-26 DIAGNOSIS — R13.10 DYSPHAGIA, UNSPECIFIED TYPE: ICD-10-CM

## 2024-07-26 DIAGNOSIS — E66.812 OBESITY, CLASS II, BMI 35-39.9: ICD-10-CM

## 2024-07-26 PROCEDURE — 74220 X-RAY XM ESOPHAGUS 1CNTRST: CPT | Mod: GC | Performed by: STUDENT IN AN ORGANIZED HEALTH CARE EDUCATION/TRAINING PROGRAM

## 2024-07-30 NOTE — RESULT ENCOUNTER NOTE
Shoaib Worley,    The report from your recent esophagram is available for you to review.  Overall, I do not see any abnormal findings.  No evidence of outlet obstruction at the EG junction.  I recommend keeping your appointment with Maria Esther Colby next month.    Sincerely,  Khanh HAQUE [facility], Hepatology, and Nutrition

## 2024-07-31 ENCOUNTER — MYC REFILL (OUTPATIENT)
Dept: FAMILY MEDICINE | Facility: CLINIC | Age: 54
End: 2024-07-31
Payer: COMMERCIAL

## 2024-07-31 ENCOUNTER — TELEPHONE (OUTPATIENT)
Dept: FAMILY MEDICINE | Facility: CLINIC | Age: 54
End: 2024-07-31
Payer: COMMERCIAL

## 2024-07-31 DIAGNOSIS — E66.812 OBESITY, CLASS II, BMI 35-39.9: ICD-10-CM

## 2024-07-31 NOTE — TELEPHONE ENCOUNTER
Prior Authorization Retail Medication Request    Medication/Dose: Ozempic (1 MG/Dose) 4MG/3ML  Diagnosis and ICD code (if different than what is on RX):    Obesity, Class II, BMI 35-39.9 [E66.9]          Insurance   Primary:     [employer]     Insurance ID:  [id number]     Pharmacy Information (if different than what is on RX)  Name:    [id number] - [place], [place] - [address]     Phone:  [phone]  Fax:[phone]

## 2024-08-01 NOTE — TELEPHONE ENCOUNTER
Writer responded via [employer].  Jewell BOCANEGRA, BSN, PHN, RN  [facility]  [phone]

## 2024-08-02 ENCOUNTER — MYC MEDICAL ADVICE (OUTPATIENT)
Dept: FAMILY MEDICINE | Facility: CLINIC | Age: 54
End: 2024-08-02
Payer: COMMERCIAL

## 2024-08-02 NOTE — TELEPHONE ENCOUNTER
Dr. Vera --    Please review and advise: Ozempic. 5/29/2024.    Did you want to prescribe something different.     Kenna Arriaga, BSN RN  [facility]

## 2024-08-02 NOTE — TELEPHONE ENCOUNTER
PA for this medication was submitted and denied in encounter dated 05/29/2024. If you would like to appeal please provide a letter of medical necessity with clinical reason and route the original encounter back to the PA team. Thank you.

## 2024-08-23 ENCOUNTER — TELEPHONE (OUTPATIENT)
Dept: GASTROENTEROLOGY | Facility: CLINIC | Age: 54
End: 2024-08-23

## 2024-08-23 ENCOUNTER — OFFICE VISIT (OUTPATIENT)
Dept: GASTROENTEROLOGY | Facility: CLINIC | Age: 54
End: 2024-08-23
Attending: PHYSICIAN ASSISTANT
Payer: COMMERCIAL

## 2024-08-23 VITALS
WEIGHT: 141.2 LBS | BODY MASS INDEX: 29.64 KG/M2 | HEART RATE: 75 BPM | SYSTOLIC BLOOD PRESSURE: 109 MMHG | DIASTOLIC BLOOD PRESSURE: 77 MMHG | HEIGHT: 58 IN | OXYGEN SATURATION: 98 %

## 2024-08-23 DIAGNOSIS — R13.10 DYSPHAGIA, UNSPECIFIED TYPE: Primary | ICD-10-CM

## 2024-08-23 DIAGNOSIS — R49.0 HOARSENESS: ICD-10-CM

## 2024-08-23 DIAGNOSIS — K22.4 ESOPHAGEAL DYSMOTILITY: ICD-10-CM

## 2024-08-23 PROCEDURE — 99215 OFFICE O/P EST HI 40 MIN: CPT | Performed by: PHYSICIAN ASSISTANT

## 2024-08-23 ASSESSMENT — PAIN SCALES - GENERAL: PAINLEVEL: NO PAIN (0)

## 2024-08-23 NOTE — PATIENT INSTRUCTIONS
It was a pleasure meeting with you today and discussing your healthcare plan. Below is a summary of what we covered:      - Consultation to ENT and speech therapy with FEES and consideration of biofeedback. If possible please bring your pills to the consultation.  - Upper endoscopic evaluation with Endoflip +/- botox     Fiber Recommendations:  Recommend starting supplementation with a powdered soluble fiber. When used on a daily basis, this can help regulate the consistency of your stools. Metamucil (psyllium) and Citrucel are preferred examples as these are gel forming fibers. Alternatives which are non gel forming include Benefiber, Soledad Powder and Inulin fiber. For males the goal is to obtain 30-35g of fiber daily and for females 20-25 between supplementation and dietary intake. You can start with 1-2 teaspoons per day, with goal to gradually increase to 1 tablespoon daily. You can increase up to 1 tablespoon three times daily if needed. It is important to stay well-hydrated with use of fiber supplementation and to make sure that the fiber powder is well mixed with water as directed on the label. You may experience some bloating with initiation of fiber, which will improve over the first few weeks. A good trial to evaluate the effect is 3-6 months. Of note, many of the fiber products contain artificial sweeteners, which can cause bloating, gas, and diarrhea in those who may be sensitive to artificial sweeteners. If this is the case, recommend trying Metamucil Premium Blend (with Stevia), Metamucil 4-in-1 without added Sweeteners, or Bellway (sweetened with Monk fruit extract).  +    Please call my nurse Vanessa ([phone]), Marvin ([phone]) with any questions or concerns.      See below for any additional questions and scheduling guidelines.    Sign up for [email or web address]: [email or web address] patient portal serves as a secure platform for accessing your medical records from the [facility]. Additionally,  [employer] facilitates easy, timely, and secure messaging with your care team. If you have not signed up, you may do so by using the provided code or calling [phone].    Coordinating your care after your visit:  There are multiple options for scheduling your follow-up care based on your provider's recommendation.    How do I schedule a follow-up clinic appointment:   After your appointment, you may receive scheduling assistance with the Clinic Coordinators by having a seat in the waiting room and a Clinic Coordinator will call you up to schedule.  Virtual visits or after you leave the clinic:  Your provider has placed a follow-up order in the [employer] portal for scheduling your return appointment. A member of the scheduling team will contact you to schedule.  [employer] Scheduling: Timely scheduling through [employer] is advised to ensure appointment availability.   Call to schedule: You may schedule your follow-up appointment(s) by calling [phone], option 1.    How do I schedule my endoscopy or colonoscopy procedure:  If a procedure, such as a colonoscopy or upper endoscopy was ordered by your provider, the scheduling team will contact you to schedule this procedure. Or you may choose to call to schedule at   [phone], option 2.  Please allow 20-30 minutes when scheduling a procedure.    How do I get my blood work done? To get your blood work done, you need to schedule a lab appointment at an [facility]. There are multiple ways to schedule:   At the clinic: The Clinic Coordinator you meet after your visit can help you schedule a lab appointment.   [facility] scheduling: [facility] offers online lab scheduling at all [facility] laboratory locations.   Call to schedule: You can call [phone] to schedule your lab appointment.    How do I schedule my imaging study: To schedule imaging studies, such as CT scans, ultrasounds, MRIs, or X-rays, contact Imaging Services at [phone].    How do  I schedule a referral to another doctor: If your provider recommended a referral to another specialist(s), the referral order was placed by your provider. You will receive a phone call to schedule this referral, or you may choose to call the number attached to the referral to self-schedule.    For Post-Visit Question(s):  For any inquiries following today's visit:  Please utilize [employer] messaging and allow 48 hours for reply or contact the Call Center during normal business hours at [phone], option 3.  For Emergent After-hours questions, contact the On-Call GI Fellow through the [facility] at [phone].  In addition, you may contact your Nurse directly using the provided contact information.    Test Results:  Test results will be accessible via [employer] in compliance with the 21st Century Cures Act. This means that your results will be available to you at the same time as your provider. Often you may see your results before your provider does. Results are reviewed by staff within two weeks with communication follow-up. Results may be released in the patient portal prior to your care team review.    Prescription Refill(s):  Medication prescribed by your provider will be addressed during your visit. For future refills, please coordinate with your pharmacy. If you have not had a recent clinic visit or routine labs, for your safety, your provider may not be able to refill your prescription.         Sincerely,    Maria Esther Colby PA-C  Division of Gastroenterology, Hepatology, and Nutrition  [facility]

## 2024-08-23 NOTE — NURSING NOTE
"Chief Complaint   Patient presents with    Follow Up       Vitals:    08/23/24 0739   BP: 109/77   Pulse: 75   SpO2: 98%   Weight: 64 kg (141 lb 3.2 oz)   Height: 1.473 m (4' 10\")       Body mass index is 29.51 kg/m .    Taisha Seaman MA   "

## 2024-08-23 NOTE — TELEPHONE ENCOUNTER
----- Message from Maria Esther Colby sent at 8/23/2024 10:11 AM CDT -----  Can we please obtain patient's prior colonoscopy record including pathology reports s from [facility].  I believe the procedure was completed in 2019.

## 2024-08-23 NOTE — PROGRESS NOTES
"Gastroenterology Visit for: Ameena Cagle 1970   MRN: [id number]     Reason for Visit:  chief complaint    Referred by: [place]  / [id number] [address] [place] [address] / [facility] [id number]  Patient Care Team:  Jacinta Vera MD as PCP - General  Anahi Josue APRN CNP as Nurse Practitioner (Dermatology)  Anahi Josue APRN CNP as Assigned Surgical Provider  Jacinta Vera MD as Assigned PCP  Anahi Rucker MD as Assigned OBGYN Provider  Lane Magana PA-C as Physician Assistant (Gastroenterology)  Lane Magana PA-C as Assigned Gastroenterology Provider    History of Present Illness:   Ameena Cagle is a 54 year old female with significant past medical history pertinent for anxiety, depression, obesity, mild asthma who is presenting as a follow-up patient however is a new patient to myself with a chief complaint of dysphagia.    -----------------------------------------------------------------------------------------------------------------------------------------------------------------------------------------------------------------------------------  Interval History August 23, 2024:    Describes symptoms as intermittent dysphagia to liquids/pills occurring 2-3 times per month. Symptomatic onset was ~ 1 year prior and has been overall stable in regards to severity and frequency. Noting \"it feels like it is high up, and I start to choke.\" When this occurs it can trigger an asthma attack. If focusing on breathing through her nose this is better rather then attempting to breath through her mouth. She has previously had an incidences of coughing pills into the mouth.     When she is taking pills she will take one at a time time placing the pill in the mouth first, then she will take a small sip of water followed by tilting her head back to initiate the swallow.     Associated symptoms also include hoarseness of the voice which reports has been \"a lot worse since January.\" Takes cetrizine and " "Flonase without improvement. Aggravated by prolonged phonation. Noting she talks on the phone for a majority of the day for her job.      She is now stooling once daily with increased hydration and use of fiber supplementation.  Possibly taking 1 teaspoon of fiber supplementation daily.  Stools are consistent with [place] stool scale type 4/3.    Denies nausea, emesis, odynophagia, substernal chest pain, dysphonia/hoarseness, chronic cough, neck pain/swelling/mass, heartburn, regurgitation, abdominal pain, diarrhea, constipation, melena, hematochezia and BRBR.     Maternal grandmother with pancreatic cancer diagnosed ~ age 77    No additional family history or GI related malignancy (esophageal, gastric, pancreatic, liver or colon).    ------------------------------------------------------------------------------------------------------------------------------------------------------------------------------    [facility] 4/16/2024  52 y/o F presents for evaluation of dysphagia.      Patient notes that she has had issues with dysphagia for the last few years - can occur with both solids and liquids. She notices when she takes pills, can feel like it gets stuck, will either drink more water or cough and pill will go down or will come back up. She will generally have dysphagia to liquids a couple of times a week, feels that it \"gets stuck up towards the top\". She will generally cough and choke and phglem will come up. She does have issues with heartburn usually 2-3x/ month, will take tums as needed which tends to resolves the symptoms. She has had more trouble with heartburn due to increasing weight. Denies abdominal pain, nausea, or vomiting.      Does note she has issues with constipation at times, otherwise has regular bowel movements.      Patient underwent a video swallow study on 3/6/24, which showed No episodes of penetration or aspiration with any of the barium coated compounds. Because of her symptoms, a 13 mm barium " tablet was given an esophageal sweep was performed. Retained barium and barium coated cracker noted in the mid esophagus. There is transient delay at the GE junction of the tablet which passes in less than a minute. She subsequently underwent an EGD on 3/29/24, a few gastric polyps were found, and she underwent empiric dilation given above findings to 21mm including GEJ and UES. Patient notes she has not had any improvement      Uses  NSAIDs a couple of times a week. Denies Tylenol. Started ozempic in may 2023 - was off for a week before the EGD.      Occasional alcohol use. Denies tobacco products. No recreational drug use.      No family history of GI related malignancy (esophageal, gastric, pancreatic, liver or colon) or family history of IBD/celiac disease.     Esophageal Questionnaire(s)    BEDQ Questionnaire      6/7/2024    10:19 PM 8/20/2024     2:19 PM   BEDQ Questionnaire: How Often Have You Had the Following?   Trouble eating solid food (meat, bread, vegetables) 0 0   Trouble eating soft foods (yogurt, jello, pudding) 0 0   Trouble swallowing liquids 0 0   Pain while swallowing 0 0   Coughing or choking while swallowing foods or liquids 2 2   Total Score: 2 2         6/7/2024    10:19 PM 8/20/2024     2:19 PM   BEDQ Questionnaire: Discomfort/Pain Ratings   Eating solid food (meat, bread, vegetables) 0 1   Eating soft foods (yogurt, jello, pudding) 0 0   Drinking liquid 0 0   Total Score: 0 1       Eckardt Questionnaire      6/7/2024    10:20 PM 8/20/2024     2:21 PM   Eckardt Questionnaire   Dysphagia 1 1   Regurgitation 1 1   Retrosternal Pain 1 0   Weight Loss (kg) 0 0   Total Score:  3 2       Promis 10 Questionnaire      6/7/2024    10:21 PM 8/20/2024     2:22 PM   PROMIS 10 FLOWSHEET DATA   In general, would you say your health is: 3 4   In general, would you say your quality of life is: 4 4   In general, how would you rate your physical health? 3 4   In general, how would you rate your mental  health, including your mood and your ability to think? 3 4   In general, how would you rate your satisfaction with your social activities and relationships? 4 4   In general, please rate how well you carry out your usual social activities and roles. (This includes activities at home, at work and in your community, and responsibilities as a parent, child, spouse, employee, friend, etc.) 4 4   To what extent are you able to carry out your everyday physical activities such as walking, climbing stairs, carrying groceries, or moving a chair? 5 5   In the past 7 days, how often have you been bothered by emotional problems such as feeling anxious, depressed, or irritable? 2 1   In the past 7 days, how would you rate your fatigue on average? 3 2   In the past 7 days, how would you rate your pain on average, where 0 means no pain, and 10 means worst imaginable pain? 2 1   Mental health question re-calculation - no clinical value 4 5   Physical health question re-calculation - no clinical value 3 4   Pain question re-calculation - no clinical value 4 4   Global Mental Health Score 15 17   Global Physical Health Score 15 17   PROMIS TOTAL - SUBSCORES 30 34           STUDIES & PROCEDURES:    EGD:     EGD (3/29/24):  - Normal esophagus. Given prior findings on                          esophagram, entire esophagus was dilated to 21mm,                          including the GEJ, and the UES. Biopsied to evaluate                          for EoE.                          - Gastroesophageal flap valve classified as Hill Grade                          II (fold present, opens with respiration).                          - A few diminutive gastric polyps, endoscopic                          diagnosis of benign fundic gland polyps.                          - Normal examined duodenum.     Final Diagnosis   A. DISTAL ESOPHAGUS, BIOPSY:  Squamous esophageal mucosa with no dysplasia, malignancy, intraepithelial eosinophils or other  abnormality      B. PROXIMAL ESOPHAGUS, BIOPSY:  Squamous esophageal mucosa with no dysplasia, malignancy, intraepithelial eosinophils or other abnormality      Colonoscopy:     EndoFLIP directed at the UES or LES (8cm (EF-325) balloon length or 16cm (EF-322) balloon length):   Date:  8cm balloon  Balloon inflation Balloon pressure CSA (mm^2) DI (mm^2/mmHg) Dmin (mm) Compliance   20 (ladmark ID)        30        40        50           16cm balloon  Balloon inflation Balloon pressure CSA (mm^2) DI (mm^2/mmHg) Dmin (mm) Compliance   30 (ladmark ID)        40        50        60        70           High Resolution Manometry:    6/10/2024  Impression    Interpretation / Findings    In the supine position, the lower esophageal sphincter was not able to relax appropriately as measured by the IRP (19.1). The EGJ morphology was type II. There was peristalsis visible. Of the supine swallows there 10/10 were categorized as normal. There were 4/10 swallows with elevated intrabolus pressure. Multiple rapid swallows (MRS) were performed. MRS DCI 5568.0 > mean DCI 4089.7. Rapid Drink Challenge (RDC) does not indicate an elevated IRP 9.9. Upright liquid swallows were performed with a median IRP of 18.1. Bolus transit as measured by impedance was complete in 10/10 swallows.       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 If clinically appropriate, please obtain a timed a barium esophagram with tablet or upper endoscopic evaluation with endoflip.         PH/Impedance:     Bravo:    CT:    Esophagram:    7/26/2024  Findings:   Examination of the esophagus reveals adequate primary and secondary  peristalsis. There were no focal strictures, masses or mucosal  abnormalities. The gastroesophageal junction is patent. No hiatal  hernia was seen on examination. There was absence of gastroesophageal  reflux.     Barium passes freely through the gastroesophageal junction with  no  call on being formed in the esophagus. A barium tablet was then given  and freely passed through the gastroesophageal junction.                                                                       Impression:   1. No evidence of gastroesophageal junction obstruction.  2. Barium tablet passed freely through the GE junction.      FL VSS:     3/6/2024  FINDINGS:   FLUOROSCOPIC TIME: 1 minutes  NUMBER OF IMAGES: 4 cine loops     Swallow study with Speech Pathology using multiple barium thicknesses.      No episodes of penetration or aspiration with any of the barium coated compounds.     Because of her symptoms, a 13 mm barium tablet was given an esophageal sweep was performed.     Retained barium and barium coated cracker noted in the mid esophagus. There is transient delay at the GE junction of the tablet which passes in less than a minute.    GES:    U/S:     XRAY:    Other:       Prior medical records were reviewed including, but not limited to, notes from referring providers, lab work, radiographic tests, and other diagnostic tests. Pertinent results were summarized above.     History     Past Medical History:   Diagnosis Date    History of colonic polyps        Past Surgical History:   Procedure Laterality Date    APPENDECTOMY  1991    laparoscopic    BUNIONECTOMY LOU BILATERAL Bilateral 2012    and      SECTION  1995    ESOPHAGOSCOPY, GASTROSCOPY, DUODENOSCOPY (EGD), COMBINED N/A 3/29/2024    Procedure: ESOPHAGOGASTRODUODENOSCOPY, WITH BIOPSY;  Surgeon: Bharathi Marx MD;  Location:  GI    HYSTERECTOMY  2001    menometrorhagia, no malignancy, ovaries, cervix remain       Social History     Socioeconomic History    Marital status:      Spouse name: Not on file    Number of children: Not on file    Years of education: Not on file    Highest education level: Not on file   Occupational History    Not on file   Tobacco Use    Smoking status:  Never    Smokeless tobacco: Never   Vaping Use    Vaping status: Never Used   Substance and Sexual Activity    Alcohol use: Not Currently     Alcohol/week: 1.0 standard drink of alcohol     Types: 1 Standard drinks or equivalent per week    Drug use: Never    Sexual activity: Not on file   Other Topics Concern    Not on file   Social History Narrative    Not on file     Social Determinants of Health     Financial Resource Strain: Low Risk  (1/19/2024)    Financial Resource Strain     Within the past 12 months, have you or your family members you live with been unable to get utilities (heat, electricity) when it was really needed?: No   Food Insecurity: Low Risk  (1/19/2024)    Food Insecurity     Within the past 12 months, did you worry that your food would run out before you got money to buy more?: No     Within the past 12 months, did the food you bought just not last and you didn t have money to get more?: No   Transportation Needs: Low Risk  (1/19/2024)    Transportation Needs     Within the past 12 months, has lack of transportation kept you from medical appointments, getting your medicines, non-medical meetings or appointments, work, or from getting things that you need?: No   Physical Activity: Not on file   Stress: Not on file   Social Connections: Not on file   Interpersonal Safety: Low Risk  (1/19/2024)    Interpersonal Safety     Do you feel physically and emotionally safe where you currently live?: Yes     Within the past 12 months, have you been hit, slapped, kicked or otherwise physically hurt by someone?: No     Within the past 12 months, have you been humiliated or emotionally abused in other ways by your partner or ex-partner?: No   Housing Stability: Low Risk  (1/19/2024)    Housing Stability     Do you have housing? : Yes     Are you worried about losing your housing?: No       Family History   Problem Relation Age of Onset    Depression Mother     Prostate Cancer Father     Cerebrovascular  "Disease Father 85        d of massive stroke    Dementia Father     Colon Polyps Sister     Colon Polyps Brother      Family history reviewed and edited as appropriate    Medications and Allergies:     Outpatient Encounter Medications as of 8/23/2024   Medication Sig Dispense Refill    albuterol (PROAIR HFA/PROVENTIL HFA/VENTOLIN HFA) 108 (90 Base) MCG/ACT inhaler       amitriptyline (ELAVIL) 25 MG tablet TAKE 1 TABLET BY MOUTH AT  BEDTIME 90 tablet 0    escitalopram (LEXAPRO) 10 MG tablet Take 1 tablet (10 mg) by mouth daily 90 tablet 3    estradiol (ESTRACE) 0.1 MG/GM vaginal cream INSERT 2 GRAMS VAGINALLY DAILY  FOR 2 WEEKS THEN 2 TO THREE  TIMES A WEEK 85 g 0    fluticasone (ARNUITY ELLIPTA) 100 MCG/ACT inhaler Inhale 1 puff into the lungs daily 90 each 3    Semaglutide, 1 MG/DOSE, (OZEMPIC) 4 MG/3ML pen Inject 1 mg subcutaneously every 7 days 9 mL 2    semaglutide (OZEMPIC, 0.25 OR 0.5 MG/DOSE,) 2 MG/3ML pen Inject 0.5 mg Subcutaneous every 7 days 3 mL 3     No facility-administered encounter medications on file as of 8/23/2024.        Allergies   Allergen Reactions    Penicillins Rash        Review of systems:  A full 10 point review of systems was obtained and was negative except for the pertinent positives and negatives stated within the HPI.    Objective Findings:   Physical Exam:    Constitutional: /77   Pulse 75   Ht 1.473 m (4' 10\")   Wt 64 kg (141 lb 3.2 oz)   SpO2 98%   BMI 29.51 kg/m    General: Alert, cooperative, no distress, well-appearing  Head: Atraumatic, normocephalic, no obvious abnormalities   Eyes: Sclera anicteric, no obvious conjunctival hemorrhage   Nose: Nares normal, no obvious malformation, no obvious rhinorrhea   Respiratory: Resting comfortably, no apparent distress, no cough.   Gastrointestinal: Round non distended    Skin: No jaundice, no obvious rash  Neurologic: AAOx3, no obvious neurologic abnormality  Psychiatric: Normal Affect, appropriate mood  Extremities: No " "obvious edema, no obvious malformation     Labs, Radiology, Pathology     Lab Results   Component Value Date    CHOL 225 (H) 01/19/2024    CHOL 275 (H) 01/12/2023    TRIG 120 01/19/2024    TRIG 136 01/12/2023    HDL 49 (L) 01/19/2024    HDL 55 01/12/2023     01/19/2024     08/25/2023    BUN 11.0 01/19/2024    BUN 14.6 08/25/2023    CO2 25 01/19/2024    CO2 22 08/25/2023        Liver Function Studies - No results for input(s): \"PROTTOTAL\", \"ALBUMIN\", \"BILITOTAL\", \"ALKPHOS\", \"AST\", \"ALT\", \"BILIDIRECT\" in the last 83225 hours.       Patient Active Problem List    Diagnosis Date Noted    Difficulty swallowing liquids 01/19/2024     Priority: Medium    Onychomycosis 01/19/2024     Priority: Medium    Generalized anxiety disorder 01/19/2024     Priority: Medium    Mild persistent asthma without complication 01/19/2024     Priority: Medium    CARDIOVASCULAR SCREENING; LDL GOAL LESS THAN 160 01/19/2024     Priority: Medium    Moderate recurrent major depression (H) 01/19/2024     Priority: Medium    Primary osteoarthritis of both knees 08/09/2023     Priority: Medium    History of gestational diabetes 01/12/2023     Priority: Medium    Obesity, Class II, BMI 35-39.9 01/12/2023     Priority: Medium      Assessment and Plan   Assessment/Plan:    Ameena Cagle is a 54 year old female with significant past medical history pertinent for anxiety, depression, obesity, mild asthma who is presenting as a follow-up patient however is a new patient to myself with a chief complaint of dysphagia.    #Hoarseness of the Voice  #Dysphagia  #Manometric EGJOO   3/6/2024 video swallow study without aspiration or penetration.  There was retained barium and retained barium cracker noted in the mid esophagus and delayed passage of the barium tablet at the GEJ <1 minutes    3/29/2024 EGD with normal evaluation of the esophagus without structural abnormality to explain patient's ongoing symptoms.  Proximal distal biopsies were " negative for eosinophilic esophagitis/lichen planus.  Empiric dilation was performed to 21 mm with a TTS balloon at the GEJ and at the UES. Without symptomatic improvement.     6/10/2024 high-resolution manometry study consistent with conclusive manometric EGJ outflow obstruction with a supine IRP of 19.1, upright IRP of 18.1 and 4/10 swallows with intra bolus pressure.    7/26/2024 time barium esophagram with tablet with adequate primary and secondary peristalsis.  The barium as well as the barium tablet passed freely through the GEJ.    Patient reports onset of swallowing difficulties to liquids/pills onset approximately 1 year prior occurring 2-3 times per month.  Symptoms have been stable in regards to severity/frequency.  Per subjective reports there seems to be a possible oral pharyngeal component.  Associated symptoms include hoarseness of the voice otherwise she denies additional extra esophageal manifestations of reflux disease as well as classic symptoms of GERD.     - Consultation to ENT and speech therapy with FEES and consideration of biofeedback.  If possible please bring your pills to the consultation.  - Upper endoscopic evaluation with Endoflip +/- botox       #Colorectal Cancer Screening   Subjective reports of colonoscopy completed in 2019.  No records on file.  No family history of CRC.     - Colonoscopy scheduled for October of 2024    Follow up plan:   Return to clinic 6 months and as needed.    The risks and benefits of my recommendations, as well as other treatment options were discussed with the patient and any available family today. All questions were answered.     Follow up: As planned above. Today, I personally spent 27 minutes in direct face to face time with the patient, of which greater than 50% of the time was spent in patient education and counseling as described above. Approximately 19 minutes were spent on indirect care associated with the patient's consultation including but not  limited to review of: patient medical records to date, clinic visits, hospital records, lab results, imaging studies, procedural documentation, and coordinating care with other providers. The findings from this review are summarized in the above note. All of the above accounted for a cumulative time of 46 minutes and was performed on the date of service.     The patient verbalized understanding of the plan and was appreciative for the time spent and information provided during the office visit.           Maria Esther Colby PA-C  Division of Gastroenterology, Hepatology, and Nutrition  [facility]       Documentation assisted by voice recognition and documentation system.

## 2024-08-23 NOTE — LETTER
"8/23/2024      Ameena Cagle  [address] [place] [address]      Dear Colleague,    Thank you for referring your patient, Ameena Cagle, to the [facility] [place]. Please see a copy of my visit note below.    Gastroenterology Visit for: Ameena Cagle 1970   MRN: [id number]     Reason for Visit:  chief complaint    Referred by: [place]  / [id number] / [facility] [id number]  Patient Care Team:  Jacinta Vera MD as PCP - General  Anahi Josue APRN CNP as Nurse Practitioner (Dermatology)  Anahi Josue APRN CNP as Assigned Surgical Provider  Jacinta Vera MD as Assigned PCP  Anahi Rucker MD as Assigned OBGYN Provider  Lane Magana PA-C as Physician Assistant (Gastroenterology)  Lane Magana PA-C as Assigned Gastroenterology Provider    History of Present Illness:   Ameena Cagle is a 54 year old female with significant past medical history pertinent for anxiety, depression, obesity, mild asthma who is presenting as a follow-up patient however is a new patient to myself with a chief complaint of dysphagia.    -----------------------------------------------------------------------------------------------------------------------------------------------------------------------------------------------------------------------------------  Interval History August 23, 2024:    Describes symptoms as intermittent dysphagia to liquids/pills occurring 2-3 times per month. Symptomatic onset was ~ 1 year prior and has been overall stable in regards to severity and frequency. Noting \"it feels like it is high up, and I start to choke.\" When this occurs it can trigger an asthma attack. If focusing on breathing through her nose this is better rather then attempting to breath through her mouth. She has previously had an incidences of coughing pills into the mouth.     When she is taking pills she will take one at a time time placing the " "pill in the mouth first, then she will take a small sip of water followed by tilting her head back to initiate the swallow.     Associated symptoms also include hoarseness of the voice which reports has been \"a lot worse since January.\" Takes cetrizine and Flonase without improvement. Aggravated by prolonged phonation. Noting she talks on the phone for a majority of the day for her job.      She is now stooling once daily with increased hydration and use of fiber supplementation.  Possibly taking 1 teaspoon of fiber supplementation daily.  Stools are consistent with [place] stool scale type 4/3.    Denies nausea, emesis, odynophagia, substernal chest pain, dysphonia/hoarseness, chronic cough, neck pain/swelling/mass, heartburn, regurgitation, abdominal pain, diarrhea, constipation, melena, hematochezia and BRBR.     Maternal grandmother with pancreatic cancer diagnosed ~ age 77    No additional family history or GI related malignancy (esophageal, gastric, pancreatic, liver or colon).    ------------------------------------------------------------------------------------------------------------------------------------------------------------------------------    [facility] 4/16/2024  52 y/o F presents for evaluation of dysphagia.      Patient notes that she has had issues with dysphagia for the last few years - can occur with both solids and liquids. She notices when she takes pills, can feel like it gets stuck, will either drink more water or cough and pill will go down or will come back up. She will generally have dysphagia to liquids a couple of times a week, feels that it \"gets stuck up towards the top\". She will generally cough and choke and phglem will come up. She does have issues with heartburn usually 2-3x/ month, will take tums as needed which tends to resolves the symptoms. She has had more trouble with heartburn due to increasing weight. Denies abdominal pain, nausea, or vomiting.      Does note she has issues " with constipation at times, otherwise has regular bowel movements.      Patient underwent a video swallow study on 3/6/24, which showed No episodes of penetration or aspiration with any of the barium coated compounds. Because of her symptoms, a 13 mm barium tablet was given an esophageal sweep was performed. Retained barium and barium coated cracker noted in the mid esophagus. There is transient delay at the GE junction of the tablet which passes in less than a minute. She subsequently underwent an EGD on 3/29/24, a few gastric polyps were found, and she underwent empiric dilation given above findings to 21mm including GEJ and UES. Patient notes she has not had any improvement      Uses  NSAIDs a couple of times a week. Denies Tylenol. Started ozempic in may 2023 - was off for a week before the EGD.      Occasional alcohol use. Denies tobacco products. No recreational drug use.      No family history of GI related malignancy (esophageal, gastric, pancreatic, liver or colon) or family history of IBD/celiac disease.     Esophageal Questionnaire(s)    BEDQ Questionnaire      6/7/2024    10:19 PM 8/20/2024     2:19 PM   BEDQ Questionnaire: How Often Have You Had the Following?   Trouble eating solid food (meat, bread, vegetables) 0 0   Trouble eating soft foods (yogurt, jello, pudding) 0 0   Trouble swallowing liquids 0 0   Pain while swallowing 0 0   Coughing or choking while swallowing foods or liquids 2 2   Total Score: 2 2         6/7/2024    10:19 PM 8/20/2024     2:19 PM   BEDQ Questionnaire: Discomfort/Pain Ratings   Eating solid food (meat, bread, vegetables) 0 1   Eating soft foods (yogurt, jello, pudding) 0 0   Drinking liquid 0 0   Total Score: 0 1       Eckardt Questionnaire      6/7/2024    10:20 PM 8/20/2024     2:21 PM   Eckardt Questionnaire   Dysphagia 1 1   Regurgitation 1 1   Retrosternal Pain 1 0   Weight Loss (kg) 0 0   Total Score:  3 2       Promis 10 Questionnaire      6/7/2024    10:21 PM  8/20/2024     2:22 PM   PROMIS 10 FLOWSHEET DATA   In general, would you say your health is: 3 4   In general, would you say your quality of life is: 4 4   In general, how would you rate your physical health? 3 4   In general, how would you rate your mental health, including your mood and your ability to think? 3 4   In general, how would you rate your satisfaction with your social activities and relationships? 4 4   In general, please rate how well you carry out your usual social activities and roles. (This includes activities at home, at work and in your community, and responsibilities as a parent, child, spouse, employee, friend, etc.) 4 4   To what extent are you able to carry out your everyday physical activities such as walking, climbing stairs, carrying groceries, or moving a chair? 5 5   In the past 7 days, how often have you been bothered by emotional problems such as feeling anxious, depressed, or irritable? 2 1   In the past 7 days, how would you rate your fatigue on average? 3 2   In the past 7 days, how would you rate your pain on average, where 0 means no pain, and 10 means worst imaginable pain? 2 1   Mental health question re-calculation - no clinical value 4 5   Physical health question re-calculation - no clinical value 3 4   Pain question re-calculation - no clinical value 4 4   Global Mental Health Score 15 17   Global Physical Health Score 15 17   PROMIS TOTAL - SUBSCORES 30 34           STUDIES & PROCEDURES:    EGD:     EGD (3/29/24):  - Normal esophagus. Given prior findings on                          esophagram, entire esophagus was dilated to 21mm,                          including the GEJ, and the UES. Biopsied to evaluate                          for EoE.                          - Gastroesophageal flap valve classified as Hill Grade                          II (fold present, opens with respiration).                          - A few diminutive gastric polyps, endoscopic                           diagnosis of benign fundic gland polyps.                          - Normal examined duodenum.     Final Diagnosis   A. DISTAL ESOPHAGUS, BIOPSY:  Squamous esophageal mucosa with no dysplasia, malignancy, intraepithelial eosinophils or other abnormality      B. PROXIMAL ESOPHAGUS, BIOPSY:  Squamous esophageal mucosa with no dysplasia, malignancy, intraepithelial eosinophils or other abnormality      Colonoscopy:     EndoFLIP directed at the UES or LES (8cm (EF-325) balloon length or 16cm (EF-322) balloon length):   Date:  8cm balloon  Balloon inflation Balloon pressure CSA (mm^2) DI (mm^2/mmHg) Dmin (mm) Compliance   20 (ladmark ID)        30        40        50           16cm balloon  Balloon inflation Balloon pressure CSA (mm^2) DI (mm^2/mmHg) Dmin (mm) Compliance   30 (ladmark ID)        40        50        60        70           High Resolution Manometry:    6/10/2024  Impression    Interpretation / Findings    In the supine position, the lower esophageal sphincter was not able to relax appropriately as measured by the IRP (19.1). The EGJ morphology was type II. There was peristalsis visible. Of the supine swallows there 10/10 were categorized as normal. There were 4/10 swallows with elevated intrabolus pressure. Multiple rapid swallows (MRS) were performed. MRS DCI 5568.0 > mean DCI 4089.7. Rapid Drink Challenge (RDC) does not indicate an elevated IRP 9.9. Upright liquid swallows were performed with a median IRP of 18.1. Bolus transit as measured by impedance was complete in 10/10 swallows.       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 If clinically appropriate, please obtain a timed a barium esophagram with tablet or upper endoscopic evaluation with endoflip.         PH/Impedance:     Bravo:    CT:    Esophagram:    7/26/2024  Findings:   Examination of the esophagus reveals adequate primary and secondary  peristalsis. There were no focal  strictures, masses or mucosal  abnormalities. The gastroesophageal junction is patent. No hiatal  hernia was seen on examination. There was absence of gastroesophageal  reflux.     Barium passes freely through the gastroesophageal junction with no  call on being formed in the esophagus. A barium tablet was then given  and freely passed through the gastroesophageal junction.                                                                       Impression:   1. No evidence of gastroesophageal junction obstruction.  2. Barium tablet passed freely through the GE junction.      FL VSS:     3/6/2024  FINDINGS:   FLUOROSCOPIC TIME: 1 minutes  NUMBER OF IMAGES: 4 cine loops     Swallow study with Speech Pathology using multiple barium thicknesses.      No episodes of penetration or aspiration with any of the barium coated compounds.     Because of her symptoms, a 13 mm barium tablet was given an esophageal sweep was performed.     Retained barium and barium coated cracker noted in the mid esophagus. There is transient delay at the GE junction of the tablet which passes in less than a minute.    GES:    U/S:     XRAY:    Other:       Prior medical records were reviewed including, but not limited to, notes from referring providers, lab work, radiographic tests, and other diagnostic tests. Pertinent results were summarized above.     History     Past Medical History:   Diagnosis Date     History of colonic polyps        Past Surgical History:   Procedure Laterality Date     APPENDECTOMY  1991    laparoscopic     BUNIONECTOMY LOU BILATERAL Bilateral 2012    and       SECTION  1995     ESOPHAGOSCOPY, GASTROSCOPY, DUODENOSCOPY (EGD), COMBINED N/A 3/29/2024    Procedure: ESOPHAGOGASTRODUODENOSCOPY, WITH BIOPSY;  Surgeon: Bharathi Marx MD;  Location:  GI     HYSTERECTOMY  2001    menometrorhagia, no malignancy, ovaries, cervix remain       Social History      Socioeconomic History     Marital status:      Spouse name: Not on file     Number of children: Not on file     Years of education: Not on file     Highest education level: Not on file   Occupational History     Not on file   Tobacco Use     Smoking status: Never     Smokeless tobacco: Never   Vaping Use     Vaping status: Never Used   Substance and Sexual Activity     Alcohol use: Not Currently     Alcohol/week: 1.0 standard drink of alcohol     Types: 1 Standard drinks or equivalent per week     Drug use: Never     Sexual activity: Not on file   Other Topics Concern     Not on file   Social History Narrative     Not on file     Social Determinants of Health     Financial Resource Strain: Low Risk  (1/19/2024)    Financial Resource Strain      Within the past 12 months, have you or your family members you live with been unable to get utilities (heat, electricity) when it was really needed?: No   Food Insecurity: Low Risk  (1/19/2024)    Food Insecurity      Within the past 12 months, did you worry that your food would run out before you got money to buy more?: No      Within the past 12 months, did the food you bought just not last and you didn t have money to get more?: No   Transportation Needs: Low Risk  (1/19/2024)    Transportation Needs      Within the past 12 months, has lack of transportation kept you from medical appointments, getting your medicines, non-medical meetings or appointments, work, or from getting things that you need?: No   Physical Activity: Not on file   Stress: Not on file   Social Connections: Not on file   Interpersonal Safety: Low Risk  (1/19/2024)    Interpersonal Safety      Do you feel physically and emotionally safe where you currently live?: Yes      Within the past 12 months, have you been hit, slapped, kicked or otherwise physically hurt by someone?: No      Within the past 12 months, have you been humiliated or emotionally abused in other ways by your partner or  "ex-partner?: No   Housing Stability: Low Risk  (1/19/2024)    Housing Stability      Do you have housing? : Yes      Are you worried about losing your housing?: No       Family History   Problem Relation Age of Onset     Depression Mother      Prostate Cancer Father      Cerebrovascular Disease Father 85        d of massive stroke     Dementia Father      Colon Polyps Sister      Colon Polyps Brother      Family history reviewed and edited as appropriate    Medications and Allergies:     Outpatient Encounter Medications as of 8/23/2024   Medication Sig Dispense Refill     albuterol (PROAIR HFA/PROVENTIL HFA/VENTOLIN HFA) 108 (90 Base) MCG/ACT inhaler        amitriptyline (ELAVIL) 25 MG tablet TAKE 1 TABLET BY MOUTH AT  BEDTIME 90 tablet 0     escitalopram (LEXAPRO) 10 MG tablet Take 1 tablet (10 mg) by mouth daily 90 tablet 3     estradiol (ESTRACE) 0.1 MG/GM vaginal cream INSERT 2 GRAMS VAGINALLY DAILY  FOR 2 WEEKS THEN 2 TO THREE  TIMES A WEEK 85 g 0     fluticasone (ARNUITY ELLIPTA) 100 MCG/ACT inhaler Inhale 1 puff into the lungs daily 90 each 3     Semaglutide, 1 MG/DOSE, (OZEMPIC) 4 MG/3ML pen Inject 1 mg subcutaneously every 7 days 9 mL 2     semaglutide (OZEMPIC, 0.25 OR 0.5 MG/DOSE,) 2 MG/3ML pen Inject 0.5 mg Subcutaneous every 7 days 3 mL 3     No facility-administered encounter medications on file as of 8/23/2024.        Allergies   Allergen Reactions     Penicillins Rash        Review of systems:  A full 10 point review of systems was obtained and was negative except for the pertinent positives and negatives stated within the HPI.    Objective Findings:   Physical Exam:    Constitutional: /77   Pulse 75   Ht 1.473 m (4' 10\")   Wt 64 kg (141 lb 3.2 oz)   SpO2 98%   BMI 29.51 kg/m    General: Alert, cooperative, no distress, well-appearing  Head: Atraumatic, normocephalic, no obvious abnormalities   Eyes: Sclera anicteric, no obvious conjunctival hemorrhage   Nose: Nares normal, no obvious " "malformation, no obvious rhinorrhea   Respiratory: Resting comfortably, no apparent distress, no cough.   Gastrointestinal: Round non distended    Skin: No jaundice, no obvious rash  Neurologic: AAOx3, no obvious neurologic abnormality  Psychiatric: Normal Affect, appropriate mood  Extremities: No obvious edema, no obvious malformation     Labs, Radiology, Pathology     Lab Results   Component Value Date    CHOL 225 (H) 01/19/2024    CHOL 275 (H) 01/12/2023    TRIG 120 01/19/2024    TRIG 136 01/12/2023    HDL 49 (L) 01/19/2024    HDL 55 01/12/2023     01/19/2024     08/25/2023    BUN 11.0 01/19/2024    BUN 14.6 08/25/2023    CO2 25 01/19/2024    CO2 22 08/25/2023        Liver Function Studies - No results for input(s): \"PROTTOTAL\", \"ALBUMIN\", \"BILITOTAL\", \"ALKPHOS\", \"AST\", \"ALT\", \"BILIDIRECT\" in the last [id number] hours.       Patient Active Problem List    Diagnosis Date Noted     Difficulty swallowing liquids 01/19/2024     Priority: Medium     Onychomycosis 01/19/2024     Priority: Medium     Generalized anxiety disorder 01/19/2024     Priority: Medium     Mild persistent asthma without complication 01/19/2024     Priority: Medium     CARDIOVASCULAR SCREENING; LDL GOAL LESS THAN 160 01/19/2024     Priority: Medium     Moderate recurrent major depression (H) 01/19/2024     Priority: Medium     Primary osteoarthritis of both knees 08/09/2023     Priority: Medium     History of gestational diabetes 01/12/2023     Priority: Medium     Obesity, Class II, BMI 35-39.9 01/12/2023     Priority: Medium      Assessment and Plan   Assessment/Plan:    Ameena Cagle is a 54 year old female with significant past medical history pertinent for anxiety, depression, obesity, mild asthma who is presenting as a follow-up patient however is a new patient to myself with a chief complaint of dysphagia.    #Hoarseness of the Voice  #Dysphagia  #Manometric EGJOO   3/6/2024 video swallow study without aspiration or penetration.  " There was retained barium and retained barium cracker noted in the mid esophagus and delayed passage of the barium tablet at the GEJ <1 minutes    3/29/2024 EGD with normal evaluation of the esophagus without structural abnormality to explain patient's ongoing symptoms.  Proximal distal biopsies were negative for eosinophilic esophagitis/lichen planus.  Empiric dilation was performed to 21 mm with a TTS balloon at the GEJ and at the UES.    6/10/2024 high-resolution manometry study consistent with conclusive manometric EGJ outflow obstruction with a supine IRP of 19.1, upright IRP of 18.1 and 4/10 swallows with intra bolus pressure.    7/26/2024 time barium esophagram with tablet with adequate primary and secondary peristalsis.  The barium as well as the barium tablet passed freely through the GEJ.    Patient reports onset of swallowing difficulties to liquids/pills onset approximately 1 year prior occurring 2-3 times per month.  Symptoms have been stable in regards to severity/frequency.  Per subjective reports there seems to be a possible oral pharyngeal component.  Associated symptoms include hoarseness of the voice otherwise she denies additional extra esophageal manifestations of reflux disease as well as classic symptoms of GERD.     - Consultation to ENT and speech therapy with FEES and consideration of biofeedback.  If possible please bring your pills to the consultation.  - Upper endoscopic evaluation with Endoflip +/- botox       #Colorectal Cancer Screening   Subjective reports of colonoscopy completed in 2019.  No records on file.  No family history of CRC.     - Colonoscopy scheduled for October of 2024    Follow up plan:   Return to clinic 6 months and as needed.    The risks and benefits of my recommendations, as well as other treatment options were discussed with the patient and any available family today. All questions were answered.     Follow up: As planned above. Today, I personally spent 27  minutes in direct face to face time with the patient, of which greater than 50% of the time was spent in patient education and counseling as described above. Approximately 19 minutes were spent on indirect care associated with the patient's consultation including but not limited to review of: patient medical records to date, clinic visits, hospital records, lab results, imaging studies, procedural documentation, and coordinating care with other providers. The findings from this review are summarized in the above note. All of the above accounted for a cumulative time of 46 minutes and was performed on the date of service.     The patient verbalized understanding of the plan and was appreciative for the time spent and information provided during the office visit.           Maria Esther Colby PA-C  Division of Gastroenterology, Hepatology, and Nutrition  [facility]       Documentation assisted by voice recognition and documentation system.            Again, thank you for allowing me to participate in the care of your patient.        Sincerely,        Maria Esther Colby PA-C

## 2024-08-28 ENCOUNTER — TELEPHONE (OUTPATIENT)
Dept: GASTROENTEROLOGY | Facility: CLINIC | Age: 54
End: 2024-08-28
Payer: COMMERCIAL

## 2024-08-28 NOTE — TELEPHONE ENCOUNTER
"Endoscopy Scheduling Screen    Have you had a positive Covid test in the last 14 days?  No    What is your communication preference for Instructions and/or Bowel Prep?   MyChart    What insurance is in the chart?  Other:  [facility]    Ordering/Referring Provider: Maria Esther Colby,   (If ordering provider performs procedure, schedule with ordering provider unless otherwise instructed. )    BMI: Estimated body mass index is 29.51 kg/m  as calculated from the following:    Height as of 8/23/24: 1.473 m (4' 10\").    Weight as of 8/23/24: 64 kg (141 lb 3.2 oz).     Sedation Ordered  MAC/deep sedation.   BMI<= 45 45 < BMI <= 48 48 < BMI < = 50  BMI > 50   No Restrictions No [facility]  No [facility]  [facility] with exceptions Hospital Only OR Only       Do you have a history of malignant hyperthermia?  No    (Females) Are you currently pregnant?   No     Have you been diagnosed or told you have pulmonary hypertension?   No    Do you have an LVAD?  No    Have you been told you have moderate to severe sleep apnea?  No    Have you been told you have COPD, asthma, or any other lung disease?  Yes     What breathing problems do you have?  Asthma     Do you use home oxygen?  No    Have your breathing problems required an ED visit or hospitalization in the last year?  No    Do you have any heart conditions?  No     Have you ever had or are you waiting for an organ transplant?  No. Continue scheduling, no site restrictions.    Have you had a stroke or transient ischemic attack (TIA aka \"mini stroke\" in the last 6 months?   No    Have you been diagnosed with or been told you have cirrhosis of the liver?   No    Are you currently on dialysis?   No    Do you need assistance transferring?   No    BMI: Estimated body mass index is 29.51 kg/m  as calculated from the following:    Height as of 8/23/24: 1.473 m (4' 10\").    Weight as of 8/23/24: 64 kg (141 lb 3.2 oz).     Is patients BMI > 40 and scheduling location [facility]?  No    Do you take an " injectable medication for weight loss or diabetes (excluding insulin)?  Yes, hold time can be up to 7 days. Please consult with you prescribing provider to discuss endoscopy recommendations.     Do you take the medication Naltrexone?  No    Do you take blood thinners?  No       Prep   Are you currently on dialysis or do you have chronic kidney disease?  No    Do you have a diagnosis of diabetes?  No    Do you have a diagnosis of cystic fibrosis (CF)?  No    On a regular basis do you go 3 -5 days between bowel movements?  Yes (Extended Prep) - Per Pt - Occasionally    BMI > 40?  No    Preferred Pharmacy:    [id number] - [place], [place] - [address] AT [facility] [address]  [address]  [place] [address]  Phone: [phone] Fax: [phone]    Final Scheduling Details     Procedure scheduled  Upper endoscopy with Endoflip    Surgeon:  Tuan     Date of procedure:  12.31.24     Pre-OP / PAC:   No - Not required for this site.    Location  [facility] - Per order.    Sedation   MAC/Deep Sedation - Per order.      Patient Reminders:   You will receive a call from a Nurse to review instructions and health history.  This assessment must be completed prior to your procedure.  Failure to complete the Nurse assessment may result in the procedure being cancelled.      On the day of your procedure, please designate an adult(s) who can drive you home stay with you for the next 24 hours. The medicines used in the exam will make you sleepy. You will not be able to drive.      You cannot take public transportation, ride share services, or non-medical taxi service without a responsible caregiver.  Medical transport services are allowed with the requirement that a responsible caregiver will receive you at your destination.  We require that drivers and caregivers are confirmed prior to your procedure.

## 2024-08-31 ENCOUNTER — OFFICE VISIT (OUTPATIENT)
Dept: URGENT CARE | Facility: URGENT CARE | Age: 54
End: 2024-08-31
Payer: COMMERCIAL

## 2024-08-31 VITALS
HEART RATE: 81 BPM | WEIGHT: 140.5 LBS | SYSTOLIC BLOOD PRESSURE: 111 MMHG | RESPIRATION RATE: 16 BRPM | DIASTOLIC BLOOD PRESSURE: 74 MMHG | BODY MASS INDEX: 29.36 KG/M2 | OXYGEN SATURATION: 97 % | TEMPERATURE: 98.3 F

## 2024-08-31 DIAGNOSIS — L23.7 CONTACT DERMATITIS DUE TO POISON IVY: Primary | ICD-10-CM

## 2024-08-31 PROCEDURE — 99213 OFFICE O/P EST LOW 20 MIN: CPT | Performed by: FAMILY MEDICINE

## 2024-08-31 RX ORDER — MOMETASONE FUROATE 1 MG/G
CREAM TOPICAL DAILY
Qty: 45 G | Refills: 0 | Status: SHIPPED | OUTPATIENT
Start: 2024-08-31

## 2024-08-31 ASSESSMENT — PAIN SCALES - GENERAL: PAINLEVEL: NO PAIN (0)

## 2024-08-31 NOTE — PROGRESS NOTES
Subjective: 3 days ago patient noticed an itchy rash on her left elbow and it has spread to the upper arms and now the lower arms and also in her right thigh.  She has been putting topical hydrocortisone and taking Benadryl at night because otherwise it is extremely itchy.  She has never had anything like this before.  She was weeding 3 or 4 days before this happened out in her garden, does not think there is poison ivy there, no other recent contacts with unusual things.    Objective: She has multiple areas that are red and slightly raised, no vesicles, centered around the antecubital area on both sides and in the right upper thigh.  They are about 1 cm round and there probably are 30 or 40 spots.    Assessment and plan: Looks and acts like contact dermatitis, not sure to what chemical or plant.  Will try some strong cortisone cream at this point, and hopefully it will run its course.

## 2024-09-25 DIAGNOSIS — N94.10 DYSPAREUNIA, FEMALE: ICD-10-CM

## 2024-09-25 RX ORDER — ESTRADIOL 0.1 MG/G
CREAM VAGINAL
Qty: 85 G | Refills: 0 | Status: SHIPPED | OUTPATIENT
Start: 2024-09-25 | End: 2024-09-27

## 2024-09-27 RX ORDER — ESTRADIOL 0.1 MG/G
CREAM VAGINAL
Qty: 85 G | Refills: 0 | Status: SHIPPED | OUTPATIENT
Start: 2024-09-27

## 2024-10-04 ENCOUNTER — TELEPHONE (OUTPATIENT)
Dept: OBGYN | Facility: CLINIC | Age: 54
End: 2024-10-04
Payer: COMMERCIAL

## 2024-10-04 NOTE — TELEPHONE ENCOUNTER
Pt called to r/s annual w/ BE originally set for 10/24 at  location due to BE being unavailable. Unfortunately, we were not able to find a time that works. Plan is to call pt when the January schedule is out so we can get her booked. Will still send a message to the nurses to make sure medication refills are good to go until that point. Pt good with this plan. 10/4 SH

## 2024-10-10 DIAGNOSIS — N94.10 DYSPAREUNIA, FEMALE: ICD-10-CM

## 2024-10-10 NOTE — TELEPHONE ENCOUNTER
Requested Prescriptions   Pending Prescriptions Disp Refills    amitriptyline (ELAVIL) 25 MG tablet 90 tablet 0     Sig: Take 1 tablet (25 mg) by mouth at bedtime.       Tricyclic Agents ( Annual appt and no PHQ9) Failed - 10/10/2024 10:36 AM        Failed - Recent (12 mo) or future (90 days) visit within authorizing provider's specialty     The patient must have completed an in-person or virtual visit within the past 12 months or has a future visit scheduled within the next 90 days with the authorizing provider s specialty.  Urgent care and e-visits do not quality as an office visit for this protocol.          Passed - Medication is active on med list        Passed - Medicaiton indicated for associated diagnosis     Medication is associated with one or more of the following diagnoses:    Alcoholism   Anxiety   Attention deficit hyperactivity disorder   Depression   Fibromyalgia   Headache    Insomnia    Neurogenic bladder   Neuropathy   Nocturnal enuresis   Pain   Panic Disorder   Smoking cessation   Tinnitus          Passed - Patient is age 18 or older        Passed - Patient is not pregnant        Passed - No positive pregnancy test on record in past 12 mos           Last Written Prescription Date:  7/14/24  Last Fill Quantity: 90,  # refills: 0   Last office visit: 7/7/23  Future Office Visit:    None    Mychart sent to pt to schedule annual-has read message but not scheduled yet.   Leesa Reddy RN on 10/14/2024 at 10:06 AM

## 2024-10-30 ENCOUNTER — TRANSFERRED RECORDS (OUTPATIENT)
Dept: HEALTH INFORMATION MANAGEMENT | Facility: CLINIC | Age: 54
End: 2024-10-30
Payer: COMMERCIAL

## 2024-12-07 ENCOUNTER — MYC REFILL (OUTPATIENT)
Dept: FAMILY MEDICINE | Facility: CLINIC | Age: 54
End: 2024-12-07
Payer: COMMERCIAL

## 2024-12-07 DIAGNOSIS — E66.812 OBESITY, CLASS II, BMI 35-39.9: ICD-10-CM

## 2024-12-09 DIAGNOSIS — E66.812 OBESITY, CLASS II, BMI 35-39.9: ICD-10-CM

## 2024-12-10 DIAGNOSIS — E66.812 OBESITY, CLASS II, BMI 35-39.9: ICD-10-CM

## 2024-12-10 RX ORDER — SEMAGLUTIDE 0.68 MG/ML
0.5 INJECTION, SOLUTION SUBCUTANEOUS
Qty: 3 ML | Refills: 3
Start: 2024-12-10

## 2024-12-13 ENCOUNTER — TELEPHONE (OUTPATIENT)
Dept: GASTROENTEROLOGY | Facility: CLINIC | Age: 54
End: 2024-12-13
Payer: COMMERCIAL

## 2024-12-13 NOTE — TELEPHONE ENCOUNTER
Pre visit planning completed.    Procedure details:    Patient scheduled for Upper endoscopy (EGD) with Endoflip on 12/31/2024.     Arrival time: 0830. Procedure time 1000    Facility location: [facility]; [address] [place] [address], [address] Floor, [place] [address]. Check in location: Main entrance at registration desk.    Sedation type: MAC    Pre op exam needed? No.    Indication for procedure: Dysphagia, Esophageal dysmotility       Chart review:     Electronic implanted devices? No    Recent diagnosis of diverticulitis within the last 6 weeks? N/A      Medication review:    Diabetic? No    Anticoagulants? No    Weight loss medication/injectable? Yes. Ozempic (Semaglutide). Weekly dosing of medication.  HOLD 7 days before procedure.  Follow up with managing provider.     Other medication HOLDING recommendations:  N/A      Prep for procedure:     Bowel prep recommendation: N/A  Due to:  EGD, Endo flip    Procedure information and instructions sent via [employer]       Gloria Barnes RN  Endoscopy Procedure Pre Assessment   [phone] option 2

## 2024-12-17 NOTE — TELEPHONE ENCOUNTER
Attempted to contact patient in order to complete pre assessment questions.     No answer. Left message to return call to [phone] option 2.    Callback required communication sent via [employer].    Tania Escalona RN  Endoscopy Procedure Pre Assessment

## 2024-12-21 DIAGNOSIS — N94.10 DYSPAREUNIA, FEMALE: ICD-10-CM

## 2024-12-23 NOTE — TELEPHONE ENCOUNTER
Requested Prescriptions   Pending Prescriptions Disp Refills    amitriptyline (ELAVIL) 25 MG tablet [Pharmacy Med Name: Amitriptyline HCl 25 MG Oral Tablet] 90 tablet 3     Sig: TAKE 1 TABLET BY MOUTH AT  BEDTIME       Tricyclic Agents Passed - 12/23/2024 12:00 PM        Passed - Medication is active on med list        Passed - Recent (12 mo) or future (90 days) visit within authorizing provider's specialty     The patient must have completed an in-person or virtual visit within the past 12 months or has a future visit scheduled within the next 90 days with the authorizing provider s specialty.  Urgent care and e-visits do not qualify as an office visit for this protocol.          Passed - Medicaiton indicated for associated diagnosis     Medication is associated with one or more of the following diagnoses:    Alcoholism   Anxiety   Attention deficit hyperactivity disorder   Depression   Fibromyalgia   Headache    Insomnia    Neurogenic bladder   Neuropathy   Nocturnal enuresis   Pain   Panic Disorder   Smoking cessation   Tinnitus          Passed - Patient is age 18 or older        Passed - Patient is not pregnant        Passed - No positive pregnancy test on record in past 12 mos           Last Written Prescription Date:  10/14/24  Last Fill Quantity: 90,  # refills: 0   Last office visit: Visit date not found ; last virtual visit: Visit date not found with prescribing provider:  Alka   Future Office Visit:      Appt schedule with Jaswant 1/8/24  Medication is being filled for 1 time refill only due to:  Patient needs to be seen because it has been more than one year since last visit.    Smith Up RN on 12/23/2024 at 12:04 PM   WE [facility]

## 2024-12-23 NOTE — TELEPHONE ENCOUNTER
Pre assessment completed for upcoming procedure.   (Please see previous telephone encounter notes for complete details)        Procedure details:    Arrival time and facility location reviewed.    Pre op exam needed? No.    Designated  policy reviewed. Instructed to have someone stay 24  hours post procedure.       Medication review:    Weight loss medication/injectable: Ozempic (Semaglutide). Weekly dosing of medication.  HOLD 7 days before procedure.  Follow up with managing provider. ..      Prep for procedure:     Procedure prep instructions reviewed.        Any additional information needed:  N/A      Patient verbalized understanding and had no questions or concerns at this time.      Shelly Molina RN  Endoscopy Procedure Pre Assessment   [phone] option 2

## 2024-12-28 DIAGNOSIS — J45.30 MILD PERSISTENT ASTHMA WITHOUT COMPLICATION: ICD-10-CM

## 2024-12-30 ENCOUNTER — ANESTHESIA EVENT (OUTPATIENT)
Dept: GASTROENTEROLOGY | Facility: CLINIC | Age: 54
End: 2024-12-30
Payer: COMMERCIAL

## 2024-12-30 RX ORDER — FLUTICASONE FUROATE 100 UG/1
POWDER RESPIRATORY (INHALATION)
Qty: 1 EACH | Refills: 0 | Status: SHIPPED | OUTPATIENT
Start: 2024-12-30

## 2024-12-30 NOTE — ANESTHESIA PREPROCEDURE EVALUATION
Anesthesia Pre-Procedure Evaluation    Patient: Ameena Cagle   MRN: [id number] : 1970        Procedure : Procedure(s):  Esophagoscopy, gastroscopy, duodenoscopy (EGD), combined  Esophageal Balloon Provocation Study          This is a 53F with obesity, recently on ozempic (GLP1 inhibitor, so some risk of delayed gastric emptying and aspiration) is getting an EGD for dysphagia      Past Medical History:   Diagnosis Date    History of colonic polyps       Past Surgical History:   Procedure Laterality Date    APPENDECTOMY  1991    laparoscopic    BUNIONECTOMY LOU BILATERAL Bilateral 2012    and      SECTION  1995    ESOPHAGOSCOPY, GASTROSCOPY, DUODENOSCOPY (EGD), COMBINED N/A 3/29/2024    Procedure: ESOPHAGOGASTRODUODENOSCOPY, WITH BIOPSY;  Surgeon: Bharathi Marx MD;  [facility]    HYSTERECTOMY  2001    menometrorhagia, no malignancy, ovaries, cervix remain      Allergies   Allergen Reactions    Penicillins Rash      Social History     Tobacco Use    Smoking status: Never    Smokeless tobacco: Never   Substance Use Topics    Alcohol use: Not Currently     Alcohol/week: 1.0 standard drink of alcohol     Types: 1 Standard drinks or equivalent per week      Wt Readings from Last 1 Encounters:   24 63.7 kg (140 lb 8 oz)        Anesthesia Evaluation            ROS/MED HX  ENT/Pulmonary:     (+)                      asthma                  Neurologic:       Cardiovascular:       METS/Exercise Tolerance:     Hematologic:       Musculoskeletal:       GI/Hepatic:       Renal/Genitourinary:       Endo:       Psychiatric/Substance Use:       Infectious Disease:       Malignancy:       Other:            Physical Exam    Airway        Mallampati: II   TM distance: > 3 FB   Neck ROM: full   Mouth opening: > 3 cm    Respiratory Devices and Support         Dental       (+) Minor Abnormalities - some fillings, tiny chips      Cardiovascular    "cardiovascular exam normal          Pulmonary   pulmonary exam normal                OUTSIDE LABS:  CBC: No results found for: \"WBC\", \"HGB\", \"HCT\", \"PLT\"  BMP:   Lab Results   Component Value Date     01/19/2024     08/25/2023    POTASSIUM 4.4 01/19/2024    POTASSIUM 4.2 08/25/2023    CHLORIDE 106 01/19/2024    CHLORIDE 106 08/25/2023    CO2 25 01/19/2024    CO2 22 08/25/2023    BUN 11.0 01/19/2024    BUN 14.6 08/25/2023    CR 0.74 01/19/2024    CR 0.87 08/25/2023    GLC 81 01/19/2024    GLC 93 08/25/2023     COAGS: No results found for: \"PTT\", \"INR\", \"FIBR\"  POC: No results found for: \"BGM\", \"HCG\", \"HCGS\"  HEPATIC: No results found for: \"ALBUMIN\", \"PROTTOTAL\", \"ALT\", \"AST\", \"GGT\", \"ALKPHOS\", \"BILITOTAL\", \"BILIDIRECT\", \"ACE\"  OTHER:   Lab Results   Component Value Date    A1C 4.8 01/19/2024    MUSTAPHA 9.5 01/19/2024       Anesthesia Plan    ASA Status:  2    NPO Status:  NPO Appropriate    Anesthesia Type: MAC.     - Reason for MAC: chronic cardiopulmonary disease, immobility needed, straight local not clinically adequate              Consents    Anesthesia Plan(s) and associated risks, benefits, and realistic alternatives discussed. Questions answered and patient/representative(s) expressed understanding.     - Discussed: Risks, Benefits and Alternatives for BOTH SEDATION and the PROCEDURE were discussed     - Discussed with:  Patient      - Extended Intubation/Ventilatory Support Discussed: No.      - Patient is DNR/DNI Status: No     Use of blood products discussed: No .     Postoperative Care       PONV prophylaxis: Background Propofol Infusion     Comments:               Kalia Zamarripa MD    I have reviewed the pertinent notes and labs in the chart from the past 30 days and (re)examined the patient.  Any updates or changes from those notes are reflected in this note.                             # Asthma: noted on problem list       "

## 2024-12-31 ENCOUNTER — HOSPITAL ENCOUNTER (OUTPATIENT)
Facility: CLINIC | Age: 54
Discharge: HOME | End: 2024-12-31
Attending: INTERNAL MEDICINE | Admitting: INTERNAL MEDICINE
Payer: COMMERCIAL

## 2024-12-31 ENCOUNTER — ANESTHESIA (OUTPATIENT)
Dept: GASTROENTEROLOGY | Facility: CLINIC | Age: 54
End: 2024-12-31
Payer: COMMERCIAL

## 2024-12-31 VITALS
OXYGEN SATURATION: 100 % | SYSTOLIC BLOOD PRESSURE: 100 MMHG | HEART RATE: 81 BPM | DIASTOLIC BLOOD PRESSURE: 79 MMHG | RESPIRATION RATE: 17 BRPM

## 2024-12-31 LAB — UPPER GI ENDOSCOPY: NORMAL

## 2024-12-31 PROCEDURE — 250N000011 HC RX IP 250 OP 636: Performed by: REGISTERED NURSE

## 2024-12-31 PROCEDURE — 91040 ESOPH BALLOON DISTENSION TST: CPT | Performed by: INTERNAL MEDICINE

## 2024-12-31 PROCEDURE — 370N000017 HC ANESTHESIA TECHNICAL FEE, PER MIN: Performed by: INTERNAL MEDICINE

## 2024-12-31 PROCEDURE — 258N000003 HC RX IP 258 OP 636: Performed by: REGISTERED NURSE

## 2024-12-31 PROCEDURE — 250N000009 HC RX 250: Performed by: REGISTERED NURSE

## 2024-12-31 PROCEDURE — 43235 EGD DIAGNOSTIC BRUSH WASH: CPT | Performed by: INTERNAL MEDICINE

## 2024-12-31 RX ORDER — NALOXONE HYDROCHLORIDE 0.4 MG/ML
0.2 INJECTION, SOLUTION INTRAMUSCULAR; INTRAVENOUS; SUBCUTANEOUS
Status: DISCONTINUED | OUTPATIENT
Start: 2024-12-31 | End: 2024-12-31 | Stop reason: HOSPADM

## 2024-12-31 RX ORDER — PROPOFOL 10 MG/ML
INJECTION, EMULSION INTRAVENOUS PRN
Status: DISCONTINUED | OUTPATIENT
Start: 2024-12-31 | End: 2024-12-31

## 2024-12-31 RX ORDER — FLUMAZENIL 0.1 MG/ML
0.2 INJECTION, SOLUTION INTRAVENOUS
Status: DISCONTINUED | OUTPATIENT
Start: 2024-12-31 | End: 2024-12-31 | Stop reason: HOSPADM

## 2024-12-31 RX ORDER — SODIUM CHLORIDE, SODIUM LACTATE, POTASSIUM CHLORIDE, CALCIUM CHLORIDE 600; 310; 30; 20 MG/100ML; MG/100ML; MG/100ML; MG/100ML
INJECTION, SOLUTION INTRAVENOUS CONTINUOUS
Status: DISCONTINUED | OUTPATIENT
Start: 2024-12-31 | End: 2024-12-31 | Stop reason: HOSPADM

## 2024-12-31 RX ORDER — LIDOCAINE HYDROCHLORIDE 20 MG/ML
INJECTION, SOLUTION INFILTRATION; PERINEURAL PRN
Status: DISCONTINUED | OUTPATIENT
Start: 2024-12-31 | End: 2024-12-31

## 2024-12-31 RX ORDER — PROCHLORPERAZINE MALEATE 5 MG/1
10 TABLET ORAL EVERY 6 HOURS PRN
Status: DISCONTINUED | OUTPATIENT
Start: 2024-12-31 | End: 2024-12-31 | Stop reason: HOSPADM

## 2024-12-31 RX ORDER — PROPOFOL 10 MG/ML
INJECTION, EMULSION INTRAVENOUS CONTINUOUS PRN
Status: DISCONTINUED | OUTPATIENT
Start: 2024-12-31 | End: 2024-12-31

## 2024-12-31 RX ORDER — NALOXONE HYDROCHLORIDE 0.4 MG/ML
0.4 INJECTION, SOLUTION INTRAMUSCULAR; INTRAVENOUS; SUBCUTANEOUS
Status: DISCONTINUED | OUTPATIENT
Start: 2024-12-31 | End: 2024-12-31 | Stop reason: HOSPADM

## 2024-12-31 RX ORDER — LIDOCAINE 40 MG/G
CREAM TOPICAL
Status: DISCONTINUED | OUTPATIENT
Start: 2024-12-31 | End: 2024-12-31 | Stop reason: HOSPADM

## 2024-12-31 RX ORDER — SODIUM CHLORIDE, SODIUM LACTATE, POTASSIUM CHLORIDE, CALCIUM CHLORIDE 600; 310; 30; 20 MG/100ML; MG/100ML; MG/100ML; MG/100ML
INJECTION, SOLUTION INTRAVENOUS CONTINUOUS PRN
Status: DISCONTINUED | OUTPATIENT
Start: 2024-12-31 | End: 2024-12-31

## 2024-12-31 RX ORDER — ONDANSETRON 2 MG/ML
4 INJECTION INTRAMUSCULAR; INTRAVENOUS EVERY 6 HOURS PRN
Status: DISCONTINUED | OUTPATIENT
Start: 2024-12-31 | End: 2024-12-31 | Stop reason: HOSPADM

## 2024-12-31 RX ORDER — ONDANSETRON 2 MG/ML
4 INJECTION INTRAMUSCULAR; INTRAVENOUS
Status: DISCONTINUED | OUTPATIENT
Start: 2024-12-31 | End: 2024-12-31 | Stop reason: HOSPADM

## 2024-12-31 RX ORDER — ONDANSETRON 4 MG/1
4 TABLET, ORALLY DISINTEGRATING ORAL EVERY 6 HOURS PRN
Status: DISCONTINUED | OUTPATIENT
Start: 2024-12-31 | End: 2024-12-31 | Stop reason: HOSPADM

## 2024-12-31 RX ADMIN — LIDOCAINE HYDROCHLORIDE 60 MG: 20 INJECTION, SOLUTION INFILTRATION; PERINEURAL at 10:29

## 2024-12-31 RX ADMIN — SODIUM CHLORIDE, POTASSIUM CHLORIDE, SODIUM LACTATE AND CALCIUM CHLORIDE: 600; 310; 30; 20 INJECTION, SOLUTION INTRAVENOUS at 10:28

## 2024-12-31 RX ADMIN — PROPOFOL 150 MCG/KG/MIN: 10 INJECTION, EMULSION INTRAVENOUS at 10:30

## 2024-12-31 RX ADMIN — PROPOFOL 40 MG: 10 INJECTION, EMULSION INTRAVENOUS at 10:30

## 2024-12-31 RX ADMIN — PROPOFOL 30 MG: 10 INJECTION, EMULSION INTRAVENOUS at 10:36

## 2024-12-31 RX ADMIN — PROPOFOL 10 MG: 10 INJECTION, EMULSION INTRAVENOUS at 10:39

## 2024-12-31 RX ADMIN — PROPOFOL 20 MG: 10 INJECTION, EMULSION INTRAVENOUS at 10:33

## 2024-12-31 ASSESSMENT — ACTIVITIES OF DAILY LIVING (ADL)
ADLS_ACUITY_SCORE: 41

## 2024-12-31 NOTE — OR NURSING
Pt underwent EGD with balloon provocation study under MAC. Specimens: none. Pt transferred to recovery and report given to endo RN.       Gloria Easton RN

## 2024-12-31 NOTE — H&P
Ameena Cagle  [id number]  female  54 year old      Reason for procedure/surgery: egd, FLIP, possible botox if EGJOO    Patient Active Problem List   Diagnosis    History of gestational diabetes    Obesity, Class II, BMI 35-39.9    Primary osteoarthritis of both knees    Difficulty swallowing liquids    Onychomycosis    Generalized anxiety disorder    Mild persistent asthma without complication    CARDIOVASCULAR SCREENING; LDL GOAL LESS THAN 160    Moderate recurrent major depression (H)       Past Surgical History:    Past Surgical History:   Procedure Laterality Date    APPENDECTOMY  1991    laparoscopic    BUNIONECTOMY LOU BILATERAL Bilateral 2012    and      SECTION  1995    ESOPHAGOSCOPY, GASTROSCOPY, DUODENOSCOPY (EGD), COMBINED N/A 3/29/2024    Procedure: ESOPHAGOGASTRODUODENOSCOPY, WITH BIOPSY;  Surgeon: Bharathi Marx MD;  Location:  GI    HYSTERECTOMY  2001    menometrorhagia, no malignancy, ovaries, cervix remain       Past Medical History:   Past Medical History:   Diagnosis Date    History of colonic polyps        Social History:   Social History     Tobacco Use    Smoking status: Never    Smokeless tobacco: Never   Substance Use Topics    Alcohol use: Not Currently     Alcohol/week: 1.0 standard drink of alcohol     Types: 1 Standard drinks or equivalent per week       Family History:   Family History   Problem Relation Age of Onset    Depression Mother     Prostate Cancer Father     Cerebrovascular Disease Father 85        d of massive stroke    Dementia Father     Colon Polyps Sister     Colon Polyps Brother        Allergies:   Allergies   Allergen Reactions    Penicillins Rash       Active Medications:   No current outpatient medications on file.       Systemic Review:   CONSTITUTIONAL: NEGATIVE for fever, chills, change in weight  ENT/MOUTH: NEGATIVE for ear, mouth and throat problems  RESP: NEGATIVE for significant cough or  SOB  CV: NEGATIVE for chest pain, palpitations or peripheral edema    Physical Examination:   Vital Signs: BP 99/74   Pulse 81   Resp 17   SpO2 96%   GENERAL: healthy, alert and no distress  NECK: no adenopathy, no asymmetry, masses, or scars  RESP: lungs clear to auscultation - no rales, rhonchi or wheezes  CV: regular rate and rhythm, normal S1 S2, no S3 or S4, no murmur, click or rub, no peripheral edema and peripheral pulses strong  ABDOMEN: soft, nontender, no hepatosplenomegaly, no masses and bowel sounds normal  MS: no gross musculoskeletal defects noted, no edema    I examined patient s dentition and informed the patient that dental injuries are a risk of any anesthetic management. No concerns were noted with this patient's dentition. . The patient has consented to proceed with the procedure.    Plan: Appropriate to proceed as scheduled.      Donato Dickerson DO  12/31/2024    PCP:  Jacinta Vera

## 2024-12-31 NOTE — ANESTHESIA CARE TRANSFER NOTE
Patient: Ameena Cagle    Procedure: Procedure(s):  Esophagoscopy, gastroscopy, duodenoscopy (EGD), combined  Esophageal Balloon Provocation Study       Diagnosis: Dysphagia, unspecified type [R13.10]  Esophageal dysmotility [K22.4]  Diagnosis Additional Information: No value filed.    Anesthesia Type:   MAC     Note:    Oropharynx: oropharynx clear of all foreign objects and spontaneously breathing  Level of Consciousness: drowsy  Oxygen Supplementation: nasal cannula  Level of Supplemental Oxygen (L/min / FiO2): 2  Independent Airway: airway patency satisfactory and stable  Dentition: dentition unchanged  Vital Signs Stable: post-procedure vital signs reviewed and stable  Report to RN Given: handoff report given  Patient transferred to: Phase II    Handoff Report: Identifed the Patient, Identified the Reponsible Provider, Reviewed the pertinent medical history, Discussed the surgical course, Reviewed Intra-OP anesthesia mangement and issues during anesthesia, Set expectations for post-procedure period and Allowed opportunity for questions and acknowledgement of understanding      Vitals:  Vitals Value Taken Time   /83    Temp     Pulse 74    Resp 14    SpO2 100        Electronically Signed By: MALAIKA Lucio CRNA  December 31, 2024  10:50 AM

## 2024-12-31 NOTE — ANESTHESIA POSTPROCEDURE EVALUATION
Patient: Ameena Cagle    Procedure: Procedure(s):  Esophagoscopy, gastroscopy, duodenoscopy (EGD), combined  Esophageal Balloon Provocation Study       Anesthesia Type:  MAC    Note:  Disposition: Outpatient   Postop Pain Control: Uneventful            Sign Out: Well controlled pain   PONV: No   Neuro/Psych: Uneventful            Sign Out: Acceptable/Baseline neuro status   Airway/Respiratory: Uneventful            Sign Out: Acceptable/Baseline resp. status   CV/Hemodynamics: Uneventful            Sign Out: Acceptable CV status; No obvious hypovolemia; No obvious fluid overload   Other NRE: NONE   DID A NON-ROUTINE EVENT OCCUR?            Last vitals:  Vitals Value Taken Time   BP 99/79 12/31/24 1100   Temp     Pulse     Resp     SpO2 100 % 12/31/24 1109   Vitals shown include unfiled device data.    Electronically Signed By: Dana Guzman MD  December 31, 2024  11:09 AM

## 2024-12-31 NOTE — OP NOTE
Please see endoscopy report for full description of the procedure.     EndoFLIP directed at the LES (16cm (EF-322) balloon length):   Date: 12/31/24    16cm balloon  Balloon inflation Balloon pressure CSA (mm^2) DI (mm^2/mmHg) Dmin (mm) Compliance   30 (ladmark ID) [id number]  3.67 11.4    40 33.3  6.5 16.6    50 45 148 3.32 13.8    60 59.1  3.57 16.4    70           RACs noted at 30-40-50ml fill volumes    Donato Dickerson DO   of Medicine  Director, Esophageal Disorders Program  Division of Gastroenterology, Hepatology, and Nutrition  [facility]

## 2025-01-08 ENCOUNTER — OFFICE VISIT (OUTPATIENT)
Dept: OBGYN | Facility: CLINIC | Age: 55
End: 2025-01-08
Payer: COMMERCIAL

## 2025-01-08 VITALS
BODY MASS INDEX: 27.66 KG/M2 | WEIGHT: 131.8 LBS | HEART RATE: 86 BPM | HEIGHT: 58 IN | SYSTOLIC BLOOD PRESSURE: 109 MMHG | DIASTOLIC BLOOD PRESSURE: 70 MMHG

## 2025-01-08 DIAGNOSIS — N94.10 DYSPAREUNIA, FEMALE: Primary | ICD-10-CM

## 2025-01-08 LAB
CLUE CELLS: ABNORMAL
TRICHOMONAS, WET PREP: ABNORMAL
WBC'S/HIGH POWER FIELD, WET PREP: ABNORMAL
YEAST, WET PREP: ABNORMAL

## 2025-01-08 PROCEDURE — 87210 SMEAR WET MOUNT SALINE/INK: CPT | Performed by: OBSTETRICS & GYNECOLOGY

## 2025-01-08 RX ORDER — ESTRADIOL 10 UG/1
10 INSERT VAGINAL
Qty: 24 TABLET | Refills: 3 | Status: SHIPPED | OUTPATIENT
Start: 2025-01-09

## 2025-01-08 ASSESSMENT — PATIENT HEALTH QUESTIONNAIRE - PHQ9
SUM OF ALL RESPONSES TO PHQ QUESTIONS 1-9: 1
10. IF YOU CHECKED OFF ANY PROBLEMS, HOW DIFFICULT HAVE THESE PROBLEMS MADE IT FOR YOU TO DO YOUR WORK, TAKE CARE OF THINGS AT HOME, OR GET ALONG WITH OTHER PEOPLE: NOT DIFFICULT AT ALL
SUM OF ALL RESPONSES TO PHQ QUESTIONS 1-9: 1

## 2025-01-08 NOTE — PATIENT INSTRUCTIONS
Oh nut  (cipriano and mil)   Pelvic wand for deeper    Delores dilator     If estrogen not seeming to help in a month, send me a note

## 2025-01-09 NOTE — PROGRESS NOTES
"CC: Recheck dyspareunia  HPI:  Ameena Cagle is a 54 year old female No LMP recorded. Patient has had a hysterectomy.  Last seen July 2023 and we had started estrogen for vaginal atrophy and dyspareunia.  She states she used the cream for about 6 months and seem to be working well at first but then started having extreme vaginal itching.  Does have a set of dilators that helped and was able to go to the second to largest 1 but still cannot successfully have penetrative intercourse.  Saw physical therapy for approximately 6 months and thinks it was helping but became very time-consuming so stopped going.  Was told that her pelvic floor was tight and trying to use relaxation exercises for initial penetration.    Still very motivated to have penetrative intercourse and wondering what else can be done?      Allergies: Penicillins    The patient's past medical history, social history and family history is reviewed at our visit and updated in EPIC.      EXAM:  Blood pressure 109/70, pulse 86, height 1.473 m (4' 10\"), weight 59.8 kg (131 lb 12.8 oz).  General - pleasant female in no acute distress.  Abdomen - soft, nontender, nondistended, no hepatosplenomegaly.  Pelvic - EG: adult female with right labia minora completely fused to majora and left labia minora partially fused.  Clitoral kapadia is fused over the clitoris (see images), BUS: within normal limits, Vagina: Very atrophic, no discharge, cervix and uterus surgically absent, Adnexae: no masses or tenderness.  Can admit 2 fingers [id number] but tight  Rectovaginal - deferred.  Psychiactric - appropriate mood and affect  Neurological - alert and oriented X 3      ASSESSMENT/PLAN:  (N94.10) Dyspareunia, female  (primary encounter diagnosis)  Comment: Physically has vaginal caliber but very atrophic  Plan: estradiol (VAGIFEM) 10 MCG TABS vaginal tablet,        Physical Therapy  Referral, Wet         preparation, amitriptyline (ELAVIL) 25 MG         tablet    "     Will switch to the vaginal tablets and see if this is less itchy.  Wet prep was done today which was negative.  Refill of Elavil as she has taken this for many years.    Discussed the Sara dilator and the oh not for her partner although the top of the vagina does have plenty of room.  Discussed using a pelvic wand as this may mentally ensure her there is enough room.  Recommend she return to physical therapy for further relaxation exercises and ensured her it is completely possible to have penetrative intercourse at her age.  Questions were answered.      Anahi Rucker MD

## 2025-01-22 SDOH — HEALTH STABILITY: PHYSICAL HEALTH: ON AVERAGE, HOW MANY DAYS PER WEEK DO YOU ENGAGE IN MODERATE TO STRENUOUS EXERCISE (LIKE A BRISK WALK)?: 4 DAYS

## 2025-01-22 SDOH — HEALTH STABILITY: PHYSICAL HEALTH: ON AVERAGE, HOW MANY MINUTES DO YOU ENGAGE IN EXERCISE AT THIS LEVEL?: 40 MIN

## 2025-01-22 ASSESSMENT — ASTHMA QUESTIONNAIRES
QUESTION_1 LAST FOUR WEEKS HOW MUCH OF THE TIME DID YOUR ASTHMA KEEP YOU FROM GETTING AS MUCH DONE AT WORK, SCHOOL OR AT HOME: NONE OF THE TIME
QUESTION_3 LAST FOUR WEEKS HOW OFTEN DID YOUR ASTHMA SYMPTOMS (WHEEZING, COUGHING, SHORTNESS OF BREATH, CHEST TIGHTNESS OR PAIN) WAKE YOU UP AT NIGHT OR EARLIER THAN USUAL IN THE MORNING: NOT AT ALL
QUESTION_5 LAST FOUR WEEKS HOW WOULD YOU RATE YOUR ASTHMA CONTROL: WELL CONTROLLED
QUESTION_4 LAST FOUR WEEKS HOW OFTEN HAVE YOU USED YOUR RESCUE INHALER OR NEBULIZER MEDICATION (SUCH AS ALBUTEROL): ONCE A WEEK OR LESS
QUESTION_2 LAST FOUR WEEKS HOW OFTEN HAVE YOU HAD SHORTNESS OF BREATH: NOT AT ALL
ACT_TOTALSCORE: 23
ACT_TOTALSCORE: 23

## 2025-01-22 ASSESSMENT — PATIENT HEALTH QUESTIONNAIRE - PHQ9
SUM OF ALL RESPONSES TO PHQ QUESTIONS 1-9: 3
10. IF YOU CHECKED OFF ANY PROBLEMS, HOW DIFFICULT HAVE THESE PROBLEMS MADE IT FOR YOU TO DO YOUR WORK, TAKE CARE OF THINGS AT HOME, OR GET ALONG WITH OTHER PEOPLE: NOT DIFFICULT AT ALL
SUM OF ALL RESPONSES TO PHQ QUESTIONS 1-9: 3

## 2025-01-22 ASSESSMENT — SOCIAL DETERMINANTS OF HEALTH (SDOH): HOW OFTEN DO YOU GET TOGETHER WITH FRIENDS OR RELATIVES?: TWICE A WEEK

## 2025-01-23 ENCOUNTER — OFFICE VISIT (OUTPATIENT)
Dept: FAMILY MEDICINE | Facility: CLINIC | Age: 55
End: 2025-01-23
Attending: FAMILY MEDICINE
Payer: COMMERCIAL

## 2025-01-23 VITALS
WEIGHT: 130.3 LBS | SYSTOLIC BLOOD PRESSURE: 98 MMHG | RESPIRATION RATE: 16 BRPM | OXYGEN SATURATION: 98 % | TEMPERATURE: 97.1 F | HEIGHT: 57 IN | BODY MASS INDEX: 28.11 KG/M2 | DIASTOLIC BLOOD PRESSURE: 65 MMHG | HEART RATE: 77 BPM

## 2025-01-23 DIAGNOSIS — Z51.81 ENCOUNTER FOR THERAPEUTIC DRUG MONITORING: ICD-10-CM

## 2025-01-23 DIAGNOSIS — Z86.32 HISTORY OF GESTATIONAL DIABETES: ICD-10-CM

## 2025-01-23 DIAGNOSIS — Z13.1 SCREENING FOR DIABETES MELLITUS: ICD-10-CM

## 2025-01-23 DIAGNOSIS — F41.1 GENERALIZED ANXIETY DISORDER: ICD-10-CM

## 2025-01-23 DIAGNOSIS — F33.1 MODERATE RECURRENT MAJOR DEPRESSION (H): ICD-10-CM

## 2025-01-23 DIAGNOSIS — Z00.00 ROUTINE GENERAL MEDICAL EXAMINATION AT A HEALTH CARE FACILITY: Primary | ICD-10-CM

## 2025-01-23 DIAGNOSIS — J45.30 MILD PERSISTENT ASTHMA WITHOUT COMPLICATION: ICD-10-CM

## 2025-01-23 DIAGNOSIS — Z13.6 CARDIOVASCULAR SCREENING; LDL GOAL LESS THAN 160: ICD-10-CM

## 2025-01-23 DIAGNOSIS — G47.25 JET LAG: ICD-10-CM

## 2025-01-23 PROBLEM — B35.1 ONYCHOMYCOSIS: Status: RESOLVED | Noted: 2024-01-19 | Resolved: 2025-01-23

## 2025-01-23 PROBLEM — E66.812 OBESITY, CLASS II, BMI 35-39.9: Status: RESOLVED | Noted: 2023-01-12 | Resolved: 2025-01-23

## 2025-01-23 LAB
ANION GAP SERPL CALCULATED.3IONS-SCNC: 8 MMOL/L (ref 7–15)
BUN SERPL-MCNC: 14.4 MG/DL (ref 6–20)
CALCIUM SERPL-MCNC: 10.3 MG/DL (ref 8.8–10.4)
CHLORIDE SERPL-SCNC: 104 MMOL/L (ref 98–107)
CHOLEST SERPL-MCNC: 206 MG/DL
CREAT SERPL-MCNC: 0.76 MG/DL (ref 0.51–0.95)
EGFRCR SERPLBLD CKD-EPI 2021: >90 ML/MIN/1.73M2
EST. AVERAGE GLUCOSE BLD GHB EST-MCNC: 88 MG/DL
FASTING STATUS PATIENT QL REPORTED: YES
FASTING STATUS PATIENT QL REPORTED: YES
GLUCOSE SERPL-MCNC: 77 MG/DL (ref 70–99)
HBA1C MFR BLD: 4.7 % (ref 0–5.6)
HCO3 SERPL-SCNC: 27 MMOL/L (ref 22–29)
HDLC SERPL-MCNC: 52 MG/DL
LDLC SERPL CALC-MCNC: 135 MG/DL
NONHDLC SERPL-MCNC: 154 MG/DL
POTASSIUM SERPL-SCNC: 4.5 MMOL/L (ref 3.4–5.3)
SODIUM SERPL-SCNC: 139 MMOL/L (ref 135–145)
TRIGL SERPL-MCNC: 97 MG/DL

## 2025-01-23 RX ORDER — ALBUTEROL SULFATE 90 UG/1
2 INHALANT RESPIRATORY (INHALATION) EVERY 4 HOURS PRN
Qty: 25.5 G | Refills: 1 | Status: SHIPPED | OUTPATIENT
Start: 2025-01-23

## 2025-01-23 RX ORDER — ESCITALOPRAM OXALATE 10 MG/1
10 TABLET ORAL DAILY
Qty: 90 TABLET | Refills: 3 | Status: SHIPPED | OUTPATIENT
Start: 2025-01-23

## 2025-01-23 RX ORDER — ZOLPIDEM TARTRATE 5 MG/1
10 TABLET ORAL
Qty: 25 TABLET | Refills: 0 | Status: SHIPPED | OUTPATIENT
Start: 2025-01-23

## 2025-01-23 ASSESSMENT — PAIN SCALES - GENERAL: PAINLEVEL_OUTOF10: NO PAIN (0)

## 2025-01-23 ASSESSMENT — ANXIETY QUESTIONNAIRES
7. FEELING AFRAID AS IF SOMETHING AWFUL MIGHT HAPPEN: NOT AT ALL
8. IF YOU CHECKED OFF ANY PROBLEMS, HOW DIFFICULT HAVE THESE MADE IT FOR YOU TO DO YOUR WORK, TAKE CARE OF THINGS AT HOME, OR GET ALONG WITH OTHER PEOPLE?: NOT DIFFICULT AT ALL
4. TROUBLE RELAXING: SEVERAL DAYS
GAD7 TOTAL SCORE: 5
IF YOU CHECKED OFF ANY PROBLEMS ON THIS QUESTIONNAIRE, HOW DIFFICULT HAVE THESE PROBLEMS MADE IT FOR YOU TO DO YOUR WORK, TAKE CARE OF THINGS AT HOME, OR GET ALONG WITH OTHER PEOPLE: NOT DIFFICULT AT ALL
GAD7 TOTAL SCORE: 5
GAD7 TOTAL SCORE: 5
7. FEELING AFRAID AS IF SOMETHING AWFUL MIGHT HAPPEN: NOT AT ALL
2. NOT BEING ABLE TO STOP OR CONTROL WORRYING: SEVERAL DAYS
1. FEELING NERVOUS, ANXIOUS, OR ON EDGE: SEVERAL DAYS
3. WORRYING TOO MUCH ABOUT DIFFERENT THINGS: SEVERAL DAYS
6. BECOMING EASILY ANNOYED OR IRRITABLE: SEVERAL DAYS
5. BEING SO RESTLESS THAT IT IS HARD TO SIT STILL: NOT AT ALL

## 2025-01-23 NOTE — PATIENT INSTRUCTIONS
What are the best ways to cope with jet lag?  There are several home remedies that can help with prevention of jet lag and easier recovery from the symptoms. The following are tips to help travelers to avoid or to minimize the effects of jet lag.  Tip 1: Stay in shape  If you are in good physical condition, stay that way. In other words, long before you embark, continue to exercise, eat right, and get plenty of rest. Your physical stamina and conditioning will enable you to cope better after you land. If you are not physically fit, or have a poor diet, begin shaping up and eating right several weeks before your trip.  Tip 2: Change your schedule     If your stay in the destination time zone will last more than a few days, begin adjusting your body to the new time zone before you leave. For example, if you are traveling from the [place] to [place] for a one-month vacation, set your daily routine back an hour or more three to four weeks before departure. Then, set it back another hour the following week and the week after that. Easing into the new schedule gradually in familiar surroundings will save your body the shock of adjusting all at once.  If you are traveling east, try going to sleep earlier and getting up and out into the early morning sun. If traveling west, try to get at least an hour's worth of sunlight as soon as possible after reaching your destination.   Tip 3: Avoid alcohol     Do not drink alcoholic beverages the day before your flight, during your flight, or the day after your flight. These beverages can cause dehydration, disrupt sleeping schedules, and trigger nausea and general discomfort.   Tip 4: Avoid caffeine     Likewise, do not drink caffeinated beverages before, during, or just after the flight. Caffeine can also cause dehydration and disrupt sleeping schedules. What's more, caffeine can jangle your nerves and intensify any travel anxiety you may already be feeling.   Tip 5: Drink water      Drink plenty of water, especially during the flight, to counteract the effects of the dry atmosphere inside the plane. Take your own water aboard the airplane if allowed.  Tip 6: Move around on the plane  While seated during your flight, exercise your legs from time to time. Move them up and down and back and forth. Bend your knees. Stand up and sit down. Every hour or two, get up and walk around. Do not take sleeping pills, and do not nap for more than an hour at a time.   These measures have a twofold purpose. First, they reduce your risk of developing a blood clot in the legs. Research shows that long periods of sitting can slow blood movement in and to the legs, thereby increasing the risk of a clot. The seat is partly to blame. It presses against the veins in the leg, restricting blood flow. Inactivity also plays a role. It decelerates the movement of blood through veins. If a clot forms, it sometimes breaks loose and travels to the lungs (known as pulmonary embolism), lodges in an artery, and inhibits blood flow. The victim may experience pain and breathing problems and cough up blood. If the clot is large, the victim could die. Second, remaining active, even in a small way, revitalizes and refreshes your body, wards off stiffness, and promotes mental and physical acuity which can ease the symptoms of jet lag.  Tip 7: Wear comfortable shoes and clothes     On a long trip, how you feel is more important than how you look. Wear comfortable clothes and shoes. Avoid items that pinch, restrict, or chafe. When selecting your trip outfit, keep in mind the climate in your destination time zone. Dress for your destination.   Tip 8: Adapt to the local schedule     The sooner you adapt to the local schedule, the quicker your body will adjust. Therefore, if you arrive at noon local time (but 6 a.m. your time), eat lunch, not breakfast. During the day, expose your body to sunlight by taking walks or sitting in outdoor  cafés. The sunlight will cue your hypothalamus to reduce the production of sleep-inducing melatonin during the day, thereby initiating the process of resetting your internal clock.  When traveling with children, try to get them on the local schedule as well. When traveling east and you will lose time, try to keep the child awake until the local bedtime. If traveling west when you will gain time, wake your child up at the local time.   Tip 9: Use sleeping medications wisely -- or not at all     Try to establish sleeping patterns without resorting to pills. However, if you have difficulty sleeping on the first two or three nights, it's OK to take a mild sedative if your physician has prescribed one. But wean yourself off the sedative as soon as possible. Otherwise, it could become habit-forming.  It showed that melatonin was effective in helping people fall asleep at doses of 0.3 mg. Larger doses of melatonin seem to be less effective after only a few days' use.  For the purpose of treating jet lag, it is suggested that a dose between 0.3 mg and 5 mg of melatonin be taken on the first day you travel at the time you will want to go to sleep at your destination. This may be continued at bedtime for a few days once you are at your destination. Melatonin seems to be most effective when crossing five or more time zones, or traveling east.  Be aware that higher doses of melatonin can cause sleepiness, lethargy, confusion, and decreased mental sharpness. Operating motor vehicles or heavy machinery should be avoided after taking your daily dose of melatonin.    Patient Education   Preventive Care Advice   This is general advice given by our system to help you stay healthy. However, your care team may have specific advice just for you. Please talk to your care team about your preventive care needs.  Nutrition  Eat 5 or more servings of fruits and vegetables each day.  Try wheat bread, brown rice and whole grain pasta (instead  of white bread, rice, and pasta).  Get enough calcium and vitamin D. Check the label on foods and aim for 100% of the RDA (recommended daily allowance).  Lifestyle  Exercise at least 150 minutes each week  (30 minutes a day, 5 days a week).  Do muscle strengthening activities 2 days a week. These help control your weight and prevent disease.  No smoking.  Wear sunscreen to prevent skin cancer.  Have a dental exam and cleaning every 6 months.  Yearly exams  See your health care team every year to talk about:  Any changes in your health.  Any medicines your care team has prescribed.  Preventive care, family planning, and ways to prevent chronic diseases.  Shots (vaccines)   HPV shots (up to age 26), if you've never had them before.  Hepatitis B shots (up to age 59), if you've never had them before.  COVID-19 shot: Get this shot when it's due.  Flu shot: Get a flu shot every year.  Tetanus shot: Get a tetanus shot every 10 years.  Pneumococcal, hepatitis A, and RSV shots: Ask your care team if you need these based on your risk.  Shingles shot (for age 50 and up)  General health tests  Diabetes screening:  Starting at age 35, Get screened for diabetes at least every 3 years.  If you are younger than age 35, ask your care team if you should be screened for diabetes.  Cholesterol test: At age 39, start having a cholesterol test every 5 years, or more often if advised.  Bone density scan (DEXA): At age 50, ask your care team if you should have this scan for osteoporosis (brittle bones).  Hepatitis C: Get tested at least once in your life.  STIs (sexually transmitted infections)  Before age 24: Ask your care team if you should be screened for STIs.  After age 24: Get screened for STIs if you're at risk. You are at risk for STIs (including HIV) if:  You are sexually active with more than one person.  You don't use condoms every time.  You or a partner was diagnosed with a sexually transmitted infection.  If you are at risk  for HIV, ask about PrEP medicine to prevent HIV.  Get tested for HIV at least once in your life, whether you are at risk for HIV or not.  Cancer screening tests  Cervical cancer screening: If you have a cervix, begin getting regular cervical cancer screening tests starting at age 21.  Breast cancer scan (mammogram): If you've ever had breasts, begin having regular mammograms starting at age 40. This is a scan to check for breast cancer.  Colon cancer screening: It is important to start screening for colon cancer at age 45.  Have a colonoscopy test every 10 years (or more often if you're at risk) Or, ask your provider about stool tests like a FIT test every year or Cologuard test every 3 years.  To learn more about your testing options, visit:   .  For help making a decision, visit:   https://bit.ly/cq75045.  Prostate cancer screening test: If you have a prostate, ask your care team if a prostate cancer screening test (PSA) at age 55 is right for you.  Lung cancer screening: If you are a current or former smoker ages 50 to 80, ask your care team if ongoing lung cancer screenings are right for you.  For informational purposes only. Not to replace the advice of your health care provider. Copyright   2023 [employer]. All rights reserved. Clinically reviewed by the [facility] Transitions Program. Simmr [id number] - REV 01/24.

## 2025-01-23 NOTE — PROGRESS NOTES
Preventive Care Visit  [facility]  Jacinta Vera MD, Family Medicine  Jan 23, 2025      Assessment & Plan     (Z00.00) Routine general medical examination at a health care facility  (primary encounter diagnosis)    (F33.1) Moderate recurrent major depression (H)  Comment:       1/19/2024     7:45 AM 1/8/2025     6:01 AM 1/22/2025     9:29 AM   PHQ   PHQ-9 Total Score 2 1  3    Q9: Thoughts of better off dead/self-harm past 2 weeks Not at all Not at all Not at all       Patient-reported     At goal  The current medical regimen is effective;  continue present plan and medications.    Plan: escitalopram (LEXAPRO) 10 MG tablet          (F41.1) Generalized anxiety disorder  Comment:       7/7/2023     8:32 AM 1/23/2025     7:56 AM   ANGELICA-7 SCORE   Total Score  5 (mild anxiety)   Total Score 2 5        Patient-reported       At goal  The current medical regimen is effective;  continue present plan and medications.    Plan: escitalopram (LEXAPRO) 10 MG tablet          (J45.30) Mild persistent asthma without complication  Comment: At goal  The current medical regimen is effective;  continue present plan and medications.    Side effects from Arnuity (dry/irritated feeling in back of throat) will switch to different device. It's unclear what insurance will cover, she will contact insurance if Flovent not covered.  Plan: fluticasone (FLOVENT DISKUS) 100 MCG/ACT         inhaler, albuterol (PROAIR HFA/PROVENTIL         HFA/VENTOLIN HFA) 108 (90 Base) MCG/ACT inhaler          (Z86.32) History of gestational diabetes  (Z13.1) Screening for diabetes mellitus  Comment: also, A1c required for biometric screening today.  Plan: Hemoglobin A1c          (Z13.6) CARDIOVASCULAR SCREENING; LDL GOAL LESS THAN 160  Comment:   LDL Cholesterol Calculated   Date Value Ref Range Status   01/19/2024 152 (H) <=100 mg/dL Final    Not at goal with weight loss over past year, will recheck, I suspect cholesterol has  "improved.  Plan: Lipid panel reflex to direct LDL Fasting          (G47.25) Jet lag  Comment: she plans to go to [place] in April, would like something to help her sleep on 11 hour flight. I provided handout on ways to manage jet leg, see hand out.  Plan: zolpidem (AMBIEN) 5 MG tablet          (Z51.81) Encounter for therapeutic drug monitoring  Plan: BASIC METABOLIC PANEL    Patient has been advised of split billing requirements and indicates understanding: Yes        BMI  Estimated body mass index is 27.95 kg/m  as calculated from the following:    Height as of this encounter: 1.454 m (4' 9.25\").    Weight as of this encounter: 59.1 kg (130 lb 4.8 oz).   Weight management plan: medication working very well for her with recent plateau, I will increase dosage from 1 to 1.25 mg weekly dosing.    Counseling  Appropriate preventive services were addressed with this patient via screening, questionnaire, or discussion as appropriate for fall prevention, nutrition, physical activity, Tobacco-use cessation, social engagement, weight loss and cognition.  Checklist reviewing preventive services available has been given to the patient.  Reviewed patient's diet, addressing concerns and/or questions.     Marisela Worley is a 54 year old, presenting for the following:  Physical and Medication Refill        1/23/2025     8:14 AM   Additional Questions   Roomed by Marisol MARQUEZ         1/23/2025   Forms   Any forms needing to be completed Yes          Medication Refill    Weight loss  She has lost 50 lbs. Currently taking 1 mg of Ozempic once per week without side effects, has plateaued with weight loss. She is     Estimated body mass index is 27.95 kg/m  as calculated from the following:    Height as of this encounter: 1.454 m (4' 9.25\").    Weight as of this encounter: 59.1 kg (130 lb 4.8 oz).    Depression and Anxiety   How are you doing with your depression since your last visit? No change  How are you doing with your anxiety " since your last visit?  No change  Are you having other symptoms that might be associated with depression or anxiety? No  Have you had a significant life event? No   Do you have any concerns with your use of alcohol or other drugs? No    Social History     Tobacco Use    Smoking status: Never    Smokeless tobacco: Never   Vaping Use    Vaping status: Never Used   Substance Use Topics    Alcohol use: Not Currently     Alcohol/week: 1.0 standard drink of alcohol     Types: 1 Standard drinks or equivalent per week    Drug use: Never         1/19/2024     7:45 AM 1/8/2025     6:01 AM 1/22/2025     9:29 AM   PHQ   PHQ-9 Total Score 2 1  3    Q9: Thoughts of better off dead/self-harm past 2 weeks Not at all Not at all Not at all       Patient-reported         7/7/2023     8:32 AM 1/23/2025     7:56 AM   ANGELICA-7 SCORE   Total Score  5 (mild anxiety)   Total Score 2 5        Patient-reported     Suicide Assessment Five-step Evaluation and Treatment (SAFE-T)    Asthma Follow-Up    Was ACT completed today?  Yes        1/22/2025     9:33 AM   ACT Total Scores   ACT TOTAL SCORE (Goal Greater than or Equal to 20) 23    In the past 12 months, how many times did you visit the emergency room for your asthma without being admitted to the hospital? 0   In the past 12 months, how many times were you hospitalized overnight because of your asthma? 0       Patient-reported        How many days per week do you miss taking your asthma controller medication?  0  Please describe any recent triggers for your asthma: None  Have you had any Emergency Room Visits, Urgent Care Visits, or Hospital Admissions since your last office visit?  No    Hx of gestational diabetes    Patient is checking blood sugars: not at all  Diabetic concerns: None   Symptoms of hypoglycemia (low blood sugar): none   Paresthesias (numbness or burning in feet) or sores: No    Lab Results   Component Value Date    A1C 4.7 01/23/2025    A1C 4.8 01/19/2024        Health Care  Directive  Patient does not have a Health Care Directive: Patient states has Advance Directive and will bring in a copy to clinic.      1/22/2025   General Health   How would you rate your overall physical health? Good   Feel stress (tense, anxious, or unable to sleep) Only a little   (!) STRESS CONCERN      1/22/2025   Nutrition   Three or more servings of calcium each day? Yes   Diet: Regular (no restrictions)   How many servings of fruit and vegetables per day? 4 or more   How many sweetened beverages each day? 0-1         1/22/2025   Exercise   Days per week of moderate/strenous exercise 4 days   Average minutes spent exercising at this level 40 min         1/22/2025   Social Factors   Frequency of gathering with friends or relatives Twice a week   Worry food won't last until get money to buy more No    No   Food not last or not have enough money for food? No    No   Do you have housing? (Housing is defined as stable permanent housing and does not include staying ouside in a car, in a tent, in an abandoned building, in an overnight shelter, or couch-surfing.) Yes    Yes   Are you worried about losing your housing? No    No   Lack of transportation? No    No   Unable to get utilities (heat,electricity)? No    No       Multiple values from one day are sorted in reverse-chronological order         1/22/2025   Fall Risk   Fallen 2 or more times in the past year? No   Trouble with walking or balance? No          1/22/2025   Dental   Dentist two times every year? Yes         1/22/2025   TB Screening   Were you born outside of the [place]? No       Today's PHQ-9 Score:       1/22/2025     9:29 AM   PHQ-9 SCORE   PHQ-9 Total Score MyChart 3 (Minimal depression)   PHQ-9 Total Score 3        Patient-reported         1/22/2025   Substance Use   Alcohol more than 3/day or more than 7/wk No   Do you use any other substances recreationally? No     Social History     Tobacco Use    Smoking status: Never    Smokeless tobacco: Never    Vaping Use    Vaping status: Never Used   Substance Use Topics    Alcohol use: Not Currently     Alcohol/week: 1.0 standard drink of alcohol     Types: 1 Standard drinks or equivalent per week    Drug use: Never             1/22/2025   Breast Cancer Screening   Family history of breast, colon, or ovarian cancer? No / Unknown         5/15/2024   LAST FHS-7 RESULTS   1st degree relative breast or ovarian cancer No   Any relative bilateral breast cancer No   Any male have breast cancer No   Any ONE woman have BOTH breast AND ovarian cancer No   Any woman with breast cancer before 50yrs No   2 or more relatives with breast AND/OR ovarian cancer No   2 or more relatives with breast AND/OR bowel cancer No        Mammogram Screening - Mammogram every 1-2 years updated in Health Maintenance based on mutual decision making        1/22/2025   STI Screening   New sexual partner(s) since last STI/HIV test? No     History of abnormal Pap smear: No - age 30- 64 PAP with HPV every 5 years recommended        Latest Ref Rng & Units 7/7/2023     8:54 AM   PAP / HPV   PAP  Negative for Intraepithelial Lesion or Malignancy (NILM)    HPV 16 DNA Negative Negative    HPV 18 DNA Negative Negative    Other HR HPV Negative Negative      ASCVD Risk   The 10-year ASCVD risk score (Nora ASENCIO, et al., 2019) is: 1.6%    Values used to calculate the score:      Age: 54 years      Sex: Female      Is Non- : No      Diabetic: No      Tobacco smoker: No      Systolic Blood Pressure: 98 mmHg      Is BP treated: No      HDL Cholesterol: 49 mg/dL      Total Cholesterol: 225 mg/dL    Reviewed and updated as needed this visit by Provider   Tobacco     Med Hx  Surg Hx  Fam Hx  Soc Hx Sexual Activity          Past Medical History:   Diagnosis Date    History of colonic polyps      Past Surgical History:   Procedure Laterality Date    APPENDECTOMY  01/01/1991    laparoscopic    BUNIONECTOMY LOU BILATERAL Bilateral  "2012    and      SECTION  1995    ESOPHAGEAL BALLOON PROVOCATION STUDY N/A 2024    Procedure: Esophageal Balloon Provocation Study;  Surgeon: Donato Dickerson DO;  Location: [facility]    ESOPHAGOSCOPY, GASTROSCOPY, DUODENOSCOPY (EGD), COMBINED N/A 2024    Procedure: ESOPHAGOGASTRODUODENOSCOPY, WITH BIOPSY;  Surgeon: Bharathi Marx MD;  Location: [facility]    ESOPHAGOSCOPY, GASTROSCOPY, DUODENOSCOPY (EGD), COMBINED N/A 2024    Procedure: Esophagoscopy, gastroscopy, duodenoscopy (EGD), combined;  Surgeon: Donato Dickerson DO;  Location: [facility]    HYSTERECTOMY  2001    menometrorhagia, no malignancy, ovaries, cervix remain     OB History    Para Term  AB Living   2 2 0 2 0 2   SAB IAB Ectopic Multiple Live Births   0 0 0 0 2      # Outcome Date GA Lbr Jamarcus/2nd Weight Sex Type Anes PTL Lv   2  99 36w0d  3.118 kg (6 lb 14 oz) F CS-LTranv   CHINYERE      Birth Comments:  labor, bedrest starting at 24 weeks      Name: Carmella Anderson  95 36w0d  2.863 kg (6 lb 5 oz) M CS-LTranv  Y CHINYERE      Birth Comments: partial placental abruption at 28 weeks, bedrest, failure to progress with labor      Complications: Failure to Progress in Second Stage      Name: Gerardo         Review of Systems  Constitutional, HEENT, cardiovascular, pulmonary, gi and gu systems are negative, except as otherwise noted.     Objective    Exam  BP 98/65 (BP Location: Right arm, Patient Position: Sitting, Cuff Size: Adult Regular)   Pulse 77   Temp 97.1  F (36.2  C) (Temporal)   Resp 16   Ht 1.454 m (4' 9.25\")   Wt 59.1 kg (130 lb 4.8 oz)   SpO2 98%   BMI 27.95 kg/m     Estimated body mass index is 27.95 kg/m  as calculated from the following:    Height as of this encounter: 1.454 m (4' 9.25\").    Weight as of this encounter: 59.1 kg (130 lb 4.8 oz).    Physical Exam  GENERAL: alert and no distress  HENT: ear canals and TM's normal, nose and mouth " without ulcers or lesions  NECK: no adenopathy, no asymmetry, masses, or scars  RESP: lungs clear to auscultation - no rales, rhonchi or wheezes  CV: regular rate and rhythm, normal S1 S2, no S3 or S4, no murmur, click or rub, no peripheral edema  ABDOMEN: soft, nontender, no hepatosplenomegaly, no masses and bowel sounds normal  MS: no gross musculoskeletal defects noted, no edema  SKIN: no suspicious lesions or rashes  NEURO: Normal strength and tone, mentation intact and speech normal  PSYCH: mentation appears normal, affect normal/bright        Signed Electronically by: Jacinta Vera MD    Answers submitted by the patient for this visit:  Patient Health Questionnaire (Submitted on 1/22/2025)  If you checked off any problems, how difficult have these problems made it for you to do your work, take care of things at home, or get along with other people?: Not difficult at all  PHQ9 TOTAL SCORE: 3  Patient Health Questionnaire (G7) (Submitted on 1/23/2025)  ANGELICA 7 TOTAL SCORE: 5

## 2025-01-23 NOTE — NURSING NOTE
Prior to immunization administration, verified patients identity using patient s name and date of birth. Please see Immunization Activity for additional information.     Screening Questionnaire for Adult Immunization    Are you sick today?   No   Do you have allergies to medications, food, a vaccine component or latex?   Yes   Have you ever had a serious reaction after receiving a vaccination?   No   Do you have a long-term health problem with heart, lung, kidney, or metabolic disease (e.g., diabetes), asthma, a blood disorder, no spleen, complement component deficiency, a cochlear implant, or a spinal fluid leak?  Are you on long-term aspirin therapy?   Yes   Do you have cancer, leukemia, HIV/AIDS, or any other immune system problem?   No   Do you have a parent, brother, or sister with an immune system problem?   No   In the past 3 months, have you taken medications that affect  your immune system, such as prednisone, other steroids, or anticancer drugs; drugs for the treatment of rheumatoid arthritis, Crohn s disease, or psoriasis; or have you had radiation treatments?   Yes   Have you had a seizure, or a brain or other nervous system problem?   No   During the past year, have you received a transfusion of blood or blood    products, or been given immune (gamma) globulin or antiviral drug?   No   For women: Are you pregnant or is there a chance you could become       pregnant during the next month?   No   Have you received any vaccinations in the past 4 weeks?   No     Immunization questionnaire was positive for at least one answer.  Notified Dr. Vera.      Patient instructed to remain in clinic for 15 minutes afterwards, and to report any adverse reactions.     Screening performed by Charo Malin MA on 1/23/2025 at 9:11 AM.

## 2025-01-23 NOTE — RESULT ENCOUNTER NOTE
Hello! Thank you for getting labs done. Your lab test to check for diabetes, HgA1C, also called glycosylated hemoglobin, which measures the level of sugar in your blood over the past few months, is normal which is great!     Congratulations, you don't have diabetes!  However, since your fasting blood sugars have been high in the past, I will continue to screen your blood sugar every year.     If you have any questions, please contact the clinic or schedule an appointment with me, thank you!    Sincerely,    DENILSON IVY MD   1/23/2025

## 2025-01-27 ENCOUNTER — TELEPHONE (OUTPATIENT)
Dept: FAMILY MEDICINE | Facility: CLINIC | Age: 55
End: 2025-01-27
Payer: COMMERCIAL

## 2025-01-27 NOTE — TELEPHONE ENCOUNTER
Prior Authorization Retail Medication Request    Medication/Dose: FLUTICASONE PROPIONATE DISKUS 100MCG/ACT AEROSOL POWDER  Diagnosis and ICD code (if different than what is on RX):    Mild persistent asthma without complication [J45.30]          Insurance   Primary: [facility]   Insurance ID:  [id number]     Pharmacy Information (if different than what is on RX)  Name:    [facility] - [address]     Phone:    [phone]       Fax:  [phone]

## 2025-01-30 ENCOUNTER — MYC MEDICAL ADVICE (OUTPATIENT)
Dept: FAMILY MEDICINE | Facility: CLINIC | Age: 55
End: 2025-01-30
Payer: COMMERCIAL

## 2025-01-30 DIAGNOSIS — J45.30 MILD PERSISTENT ASTHMA WITHOUT COMPLICATION: Primary | ICD-10-CM

## 2025-01-30 NOTE — TELEPHONE ENCOUNTER
PRIOR AUTHORIZATION DENIED    Medication: FLUTICASONE PROPIONATE DISKUS 100 MCG/ACT IN AEPB  Insurance Company: [employer] ([facility]) - Phone [phone] Fax [phone]  Denial Date: 1/30/2025    Denial Reason(s):   The request for coverage for FLUTICASONE AER 100MCG, use as directed (1 per month), is denied.  This decision is based on health plan criteria for FLUTICASONE AER 100MCG. This medicine is  covered only if:  You have failed or cannot use QVAR RediHaler.  The information provided does not show that you meet the criteria listed above.  Reviewed by: Janett    Appeal Information: If provider would like to appeal please provide a letter of medical necessity.

## 2025-01-30 NOTE — TELEPHONE ENCOUNTER
Central Prior Authorization Team  Phone: [phone]    PA Initiation    Medication: FLUTICASONE PROPIONATE DISKUS 100 MCG/ACT IN AEPB  Insurance Company: "[employer]" ([facility]) - Phone [phone] Fax [phone]  Pharmacy Filling the Rx: [facility] - [place], [place] - [address] [facility]  Filling Pharmacy Phone: [phone]  Filling Pharmacy Fax: [phone]  Start Date: 1/30/2025

## 2025-01-30 NOTE — TELEPHONE ENCOUNTER
From 1/23/25 OV:  Side effects from Arnuity (dry/irritated feeling in back of throat) will switch to different device. It's unclear what insurance will cover, she will contact insurance if Flovent not covered.  Plan: fluticasone (FLOVENT DISKUS) 100 MCG/ACT         inhaler, albuterol (PROAIR HFA/PROVENTIL         HFA/VENTOLIN HFA) 108 (90 Base) MCG/ACT inhaler    [employer] message sent to patient.  DHEERAJ Banks BSN, PHN, RN-[facility]  [phone]

## 2025-02-04 NOTE — TELEPHONE ENCOUNTER
" -- pended. Please advise    \"I checked with my insurance regarding the inhaler. They said the QVAR RediHaler is in the formulary without the need for prior authorization could you please check to see if a 90-day prescription for that could be sent to [facility]? Thank you!\"    Susan Alegre RN  [facility]    "

## 2025-03-01 ENCOUNTER — OFFICE VISIT (OUTPATIENT)
Dept: URGENT CARE | Facility: URGENT CARE | Age: 55
End: 2025-03-01
Payer: COMMERCIAL

## 2025-03-01 VITALS
TEMPERATURE: 99.2 F | BODY MASS INDEX: 27.67 KG/M2 | SYSTOLIC BLOOD PRESSURE: 105 MMHG | HEART RATE: 67 BPM | DIASTOLIC BLOOD PRESSURE: 69 MMHG | WEIGHT: 129 LBS | OXYGEN SATURATION: 97 %

## 2025-03-01 DIAGNOSIS — R39.89 URINARY PROBLEM: Primary | ICD-10-CM

## 2025-03-01 LAB
ALBUMIN UR-MCNC: NEGATIVE MG/DL
APPEARANCE UR: CLEAR
BILIRUB UR QL STRIP: NEGATIVE
CLUE CELLS: ABNORMAL
COLOR UR AUTO: YELLOW
GLUCOSE UR STRIP-MCNC: NEGATIVE MG/DL
HGB UR QL STRIP: NEGATIVE
KETONES UR STRIP-MCNC: NEGATIVE MG/DL
LEUKOCYTE ESTERASE UR QL STRIP: ABNORMAL
NITRATE UR QL: NEGATIVE
PH UR STRIP: 7 [PH] (ref 5–7)
RBC #/AREA URNS AUTO: NORMAL /HPF
SP GR UR STRIP: 1.02 (ref 1–1.03)
TRICHOMONAS, WET PREP: ABNORMAL
UROBILINOGEN UR STRIP-ACNC: 0.2 E.U./DL
WBC #/AREA URNS AUTO: NORMAL /HPF
WBC'S/HIGH POWER FIELD, WET PREP: ABNORMAL
YEAST, WET PREP: PRESENT

## 2025-03-01 PROCEDURE — 99214 OFFICE O/P EST MOD 30 MIN: CPT | Performed by: FAMILY MEDICINE

## 2025-03-01 PROCEDURE — 3078F DIAST BP <80 MM HG: CPT | Performed by: FAMILY MEDICINE

## 2025-03-01 PROCEDURE — 87210 SMEAR WET MOUNT SALINE/INK: CPT | Performed by: FAMILY MEDICINE

## 2025-03-01 PROCEDURE — 81001 URINALYSIS AUTO W/SCOPE: CPT | Performed by: FAMILY MEDICINE

## 2025-03-01 PROCEDURE — 87086 URINE CULTURE/COLONY COUNT: CPT | Performed by: FAMILY MEDICINE

## 2025-03-01 PROCEDURE — 3074F SYST BP LT 130 MM HG: CPT | Performed by: FAMILY MEDICINE

## 2025-03-01 RX ORDER — FLUCONAZOLE 150 MG/1
150 TABLET ORAL ONCE
Qty: 2 TABLET | Refills: 0 | Status: SHIPPED | OUTPATIENT
Start: 2025-03-01 | End: 2025-03-01

## 2025-03-01 RX ORDER — METRONIDAZOLE 500 MG/1
500 TABLET ORAL 2 TIMES DAILY
Qty: 14 TABLET | Refills: 0 | Status: SHIPPED | OUTPATIENT
Start: 2025-03-01 | End: 2025-03-08

## 2025-03-01 ASSESSMENT — ENCOUNTER SYMPTOMS: DYSURIA: 1

## 2025-03-01 NOTE — PATIENT INSTRUCTIONS
I am going to treat for both bacterial vaginosis and yeast.  You may take the fluconazole today and again in 1 week after finishing the bacterial vaginosis medication    Do not drink while taking these medications as they can cause some nausea and vomiting when mixed with alcohol    I have sent the urine for culture just in case we have missed anything of a UTI

## 2025-03-01 NOTE — PROGRESS NOTES
Patient presents with:  Urinary Problem  Vaginal Problem      Assessment/MDM:    Ameena Cagle is a 54 year old female is here today for suspect BV and yeast . UA is neg- will send for culture [facility]. Suspect BV      I ordered and reviewed 4 tests pertaining to today's illness.      HPI:  Vaginal Problem   This is a new problem. The current episode started yesterday. The problem occurs constantly. The problem has not changed since onset.The discharge occurs During urination and after urination. Associated symptoms include dysuria and genital itching.        Review of Systems   Genitourinary:  Positive for dysuria and vaginal discharge.   All other systems reviewed and are negative.      Vitals:    03/01/25 1215   BP: 105/69   BP Location: Right arm   Patient Position: Sitting   Cuff Size: Adult Regular   Pulse: 67   Temp: 99.2  F (37.3  C)   TempSrc: Oral   SpO2: 97%   Weight: 58.5 kg (129 lb)       Physical Exam  Vitals and nursing note reviewed.         Results:  Results for orders placed or performed in visit on 03/01/25   UA Macroscopic with reflex to Microscopic and Culture - Clinic Collect     Status: Abnormal    Specimen: Urine, Clean Catch   Result Value Ref Range    Color Urine Yellow Colorless, Straw, Light Yellow, Yellow    Appearance Urine Clear Clear    Glucose Urine Negative Negative mg/dL    Bilirubin Urine Negative Negative    Ketones Urine Negative Negative mg/dL    Specific Gravity Urine 1.020 1.003 - 1.035    Blood Urine Negative Negative    pH Urine 7.0 5.0 - 7.0    Protein Albumin Urine Negative Negative mg/dL    Urobilinogen Urine 0.2 0.2, 1.0 E.U./dL    Nitrite Urine Negative Negative    Leukocyte Esterase Urine Trace (A) Negative   UA Microscopic with Reflex to Culture     Status: Normal   Result Value Ref Range    RBC Urine None Seen 0-2 /HPF /HPF    WBC Urine 0-5 0-5 /HPF /HPF    Narrative    Urine Culture not indicated   Wet prep - Clinic Collect     Status: Abnormal    Specimen: Vagina;  Swab   Result Value Ref Range    Trichomonas Absent Absent    Yeast Present (A) Absent    Clue Cells Absent Absent    WBCs/high power field 2+ (A) None           Past Medical History: has been reviewed by me. I have also reviewed past visits, lab results and studies  Adverse Drug Reactions: Penicillins    Medications: reviewed by me today    Family History: Reviewed by me today  Social History:   Social History     Tobacco Use    Smoking status: Never    Smokeless tobacco: Never   Substance Use Topics    Alcohol use: Not Currently     Alcohol/week: 1.0 standard drink of alcohol     Types: 1 Standard drinks or equivalent per week       Tobacco:   History   Smoking Status    Never   Smokeless Tobacco    Never         I have reviewed and recommended any over-the-counter medications that will aid in the symptomatic relief of this illness.    The risk of complications, morbidity, and/or mortality of patient management decisions were made during the visit with the patient. These may be associated with the patient s problems, the diagnostic procedures, or the treatment. This includes possible management options selected, as well options considered but ultimately not selected, after shared medical decision making with the patient and/or family.        ICD-10-CM    1. Urinary problem  R39.89 Wet prep - Clinic Collect     UA Macroscopic with reflex to Microscopic and Culture - Clinic Collect     UA Microscopic with Reflex to Culture     Urine Culture Aerobic Bacterial - lab collect     metroNIDAZOLE (FLAGYL) 500 MG tablet     fluconazole (DIFLUCAN) 150 MG tablet     Urine Culture Aerobic Bacterial - lab collect           Yung Javier MD  3/1/2025, 1:44 PM.      Patient Instructions   I am going to treat for both bacterial vaginosis and yeast.  You may take the fluconazole today and again in 1 week after finishing the bacterial vaginosis medication    Do not drink while taking these medications as they can cause some nausea  and vomiting when mixed with alcohol    I have sent the urine for culture just in case we have missed anything of a UTI

## 2025-03-03 LAB — BACTERIA UR CULT: NORMAL

## 2025-03-04 DIAGNOSIS — J45.30 MILD PERSISTENT ASTHMA WITHOUT COMPLICATION: ICD-10-CM

## 2025-03-04 RX ORDER — ALBUTEROL SULFATE 90 UG/1
INHALANT RESPIRATORY (INHALATION)
Qty: 25.5 G | Refills: 11 | Status: SHIPPED | OUTPATIENT
Start: 2025-03-04

## 2025-03-07 ENCOUNTER — MYC MEDICAL ADVICE (OUTPATIENT)
Dept: FAMILY MEDICINE | Facility: CLINIC | Age: 55
End: 2025-03-07
Payer: COMMERCIAL

## 2025-03-07 NOTE — LETTER
3/7/2025      Ameena Cagle  [address] [place] [address]    Ameena Cagle is a patient currently under my care. She has been prescribed the following medications for ongoing management of asthma, anxiety and depression.    Current Outpatient Medications   Medication Instructions    albuterol (PROAIR HFA/PROVENTIL HFA/VENTOLIN HFA) 108 (90 Base) MCG/ACT inhaler INHALE 2 INHALATIONS BY MOUTH  INTO THE LUNGS EVERY 4 HOURS AS  NEEDED FOR SHORTNESS OF BREATH , WHEEZING, OR COUGH    amitriptyline (ELAVIL) 25 mg, Oral, AT BEDTIME    beclomethasone HFA (QVAR REDIHALER) 80 MCG/ACT inhaler 1 puff, Inhalation, 2 TIMES DAILY    escitalopram (LEXAPRO) 10 mg, Oral, DAILY    estradiol (VAGIFEM) 10 mcg, Vaginal, TWICE WEEKLY    fluticasone (FLOVENT DISKUS) 100 MCG/ACT inhaler 1 puff, Inhalation, EVERY 12 HOURS    Semaglutide (1 MG/DOSE) (OZEMPIC) 1.25 mg, Subcutaneous, EVERY 7 DAYS    zolpidem (AMBIEN) 10 mg, Oral, AT BEDTIME PRN        Sincerely,    Dr. Jacinta Vera MD  3/13/2025          Sincerely,        Jacinta Vera MD    Electronically signed

## 2025-03-07 NOTE — TELEPHONE ENCOUNTER
Writer responded to pt via [employer].    Fritz Harrell, BSN, PHN, RN-[facility]

## 2025-03-10 NOTE — TELEPHONE ENCOUNTER
Dr. Vera - see MyChart, letter pended for review/edits. Included both the condensed med list and screenshot of pt meds copy for you to keep in whichever is most appropriate.    DHEERAJ Palacios, BSN, PHN, AMB-BC (she/her)  [facility] RN

## 2025-04-11 ENCOUNTER — MYC MEDICAL ADVICE (OUTPATIENT)
Dept: FAMILY MEDICINE | Facility: CLINIC | Age: 55
End: 2025-04-11
Payer: COMMERCIAL

## 2025-04-24 ENCOUNTER — VIRTUAL VISIT (OUTPATIENT)
Dept: FAMILY MEDICINE | Facility: CLINIC | Age: 55
End: 2025-04-24
Payer: COMMERCIAL

## 2025-04-24 DIAGNOSIS — B37.31 YEAST INFECTION OF THE VAGINA: ICD-10-CM

## 2025-04-24 DIAGNOSIS — E66.812 OBESITY, CLASS II, BMI 35-39.9: Primary | ICD-10-CM

## 2025-04-24 RX ORDER — FLUCONAZOLE 150 MG/1
150 TABLET ORAL
Qty: 3 TABLET | Refills: 0 | Status: SHIPPED | OUTPATIENT
Start: 2025-04-24 | End: 2025-05-01

## 2025-04-24 NOTE — PROGRESS NOTES
Brunilda is a 54 year old who is being evaluated via a billable video visit.    How would you like to obtain your AVS? MyChart  If the video visit is dropped, the invitation should be resent by: Text to cell phone: [phone]  Will anyone else be joining your video visit? No      Assessment & Plan     (E66.812) Obesity, Class II, BMI 35-39.9  (primary encounter diagnosis)  Doing well with current medication, continuing to lose weight. Needs rx transferred to [facility].  Plan: Semaglutide, 2 MG/DOSE, (OZEMPIC) 8 MG/3ML pen          (B37.31) Yeast infection of the vagina  Comment: recently treated with abx, developed yeast infection, see order.  Plan: fluconazole (DIFLUCAN) 150 MG tablet          Subjective   Brunilda is a 54 year old, presenting for the following health issues:  Recheck Medication (Semaglutide) and Vaginal Problem (Yeast infection)        4/24/2025     4:44 PM   Additional Questions   Roomed by Marisol MARQUEZ     Vaginal Problem     History of Present Illness       Reason for visit:  Moving semaglutide prescription from [facility] pharmacy to [facility]    She eats 4 or more servings of fruits and vegetables daily.She consumes 0 sweetened beverage(s) daily.She exercises with enough effort to increase her heart rate 30 to 60 minutes per day.  She exercises with enough effort to increase her heart rate 5 days per week.   She is taking medications regularly.      She's been on semaglutide for 2 years, playing for Ozempic at [facility] out of pocket. Then switched to [facility] pharmacy but they are no longer compounding this. She's been on 1.25 mg weekly dose for 3 months.  She's lost 60 lbs and does continue to lose weight, doesn't think about food and doesn't feel as hungry. She would like to lose more. She manages constipation with fiber and miralax. She denies nausea at current dose. She's found out that the cost will be $900 even with coupons.  She does not have diabetes. She does not have  "MACHO.    Review of Systems  Constitutional, HEENT, cardiovascular, pulmonary, gi and gu systems are negative, except as otherwise noted.      Objective    Vitals - Patient Reported  Weight (Patient Reported): 54.4 kg (120 lb)  Height (Patient Reported): 144.8 cm (4' 9\")  BMI (Based on Pt Reported Ht/Wt): 25.97      Vitals:  No vitals were obtained today due to virtual visit.    Physical Exam   GENERAL: alert and no distress  EYES: Eyes grossly normal to inspection.  No discharge or erythema, or obvious scleral/conjunctival abnormalities.  RESP: No audible wheeze, cough, or visible cyanosis.    SKIN: Visible skin clear. No significant rash, abnormal pigmentation or lesions.  NEURO: Cranial nerves grossly intact.  Mentation and speech appropriate for age.  PSYCH: Appropriate affect, tone, and pace of words        Video-Visit Details    Type of service:  Video Visit   Originating Location (pt. Location): Home    Distant Location (provider location):  Off-site  Platform used for Video Visit: Lorena  Signed Electronically by: Jacinta Vera MD    "

## 2025-04-25 PROBLEM — M62.838 MUSCLE SPASM: Status: ACTIVE | Noted: 2025-04-25

## 2025-04-25 PROBLEM — N94.10 DYSPAREUNIA, FEMALE: Status: ACTIVE | Noted: 2023-08-30

## 2025-05-21 ENCOUNTER — ANCILLARY PROCEDURE (OUTPATIENT)
Dept: MAMMOGRAPHY | Facility: CLINIC | Age: 55
End: 2025-05-21
Attending: FAMILY MEDICINE
Payer: COMMERCIAL

## 2025-05-21 DIAGNOSIS — Z12.31 VISIT FOR SCREENING MAMMOGRAM: ICD-10-CM

## 2025-05-21 PROCEDURE — 77063 BREAST TOMOSYNTHESIS BI: CPT | Mod: TC | Performed by: RADIOLOGY

## 2025-05-21 PROCEDURE — 77067 SCR MAMMO BI INCL CAD: CPT | Mod: TC | Performed by: RADIOLOGY

## 2025-07-03 DIAGNOSIS — J45.30 MILD PERSISTENT ASTHMA WITHOUT COMPLICATION: ICD-10-CM

## 2025-07-03 RX ORDER — BECLOMETHASONE DIPROPIONATE HFA 80 UG/1
AEROSOL, METERED RESPIRATORY (INHALATION) 2 TIMES DAILY
Qty: 21.2 G | Refills: 3 | Status: SHIPPED | OUTPATIENT
Start: 2025-07-03

## 2025-07-17 ENCOUNTER — MYC MEDICAL ADVICE (OUTPATIENT)
Dept: FAMILY MEDICINE | Facility: CLINIC | Age: 55
End: 2025-07-17
Payer: COMMERCIAL

## 2025-07-18 NOTE — TELEPHONE ENCOUNTER
Dr. Vera --    Patient would like to switch to [employer] and possibly purchase it through the direct to consumer program since it costs less.   Please initiate process to [employer] directly.     Thank you,  Kenna Arriaga, GIANN RN  [facility]

## (undated) DEVICE — ENDO BALLOON FOR ESOPHAGEAL BALLOON PROVOCATION STUDY

## (undated) RX ORDER — LIDOCAINE HYDROCHLORIDE 20 MG/ML
JELLY TOPICAL
Status: DISPENSED
Start: 2024-06-10

## (undated) RX ORDER — FENTANYL CITRATE 50 UG/ML
INJECTION, SOLUTION INTRAMUSCULAR; INTRAVENOUS
Status: DISPENSED
Start: 2024-03-29